# Patient Record
Sex: FEMALE | Race: WHITE | Employment: FULL TIME | URBAN - METROPOLITAN AREA
[De-identification: names, ages, dates, MRNs, and addresses within clinical notes are randomized per-mention and may not be internally consistent; named-entity substitution may affect disease eponyms.]

---

## 2017-05-02 ENCOUNTER — RADIANT APPOINTMENT (OUTPATIENT)
Dept: MAMMOGRAPHY | Facility: CLINIC | Age: 57
End: 2017-05-02
Attending: FAMILY MEDICINE
Payer: COMMERCIAL

## 2017-05-02 DIAGNOSIS — Z12.31 VISIT FOR SCREENING MAMMOGRAM: ICD-10-CM

## 2017-05-02 PROCEDURE — G0202 SCR MAMMO BI INCL CAD: HCPCS | Mod: TC

## 2017-10-24 ENCOUNTER — OFFICE VISIT (OUTPATIENT)
Dept: FAMILY MEDICINE | Facility: CLINIC | Age: 57
End: 2017-10-24
Payer: COMMERCIAL

## 2017-10-24 VITALS
OXYGEN SATURATION: 99 % | TEMPERATURE: 96.7 F | DIASTOLIC BLOOD PRESSURE: 70 MMHG | RESPIRATION RATE: 14 BRPM | BODY MASS INDEX: 17.47 KG/M2 | HEART RATE: 65 BPM | SYSTOLIC BLOOD PRESSURE: 102 MMHG | HEIGHT: 70 IN | WEIGHT: 122 LBS

## 2017-10-24 DIAGNOSIS — Z00.00 ROUTINE GENERAL MEDICAL EXAMINATION AT A HEALTH CARE FACILITY: Primary | ICD-10-CM

## 2017-10-24 DIAGNOSIS — Z13.220 LIPID SCREENING: ICD-10-CM

## 2017-10-24 DIAGNOSIS — Z23 NEED FOR VACCINATION: ICD-10-CM

## 2017-10-24 DIAGNOSIS — Z23 NEED FOR PROPHYLACTIC VACCINATION WITH TETANUS-DIPHTHERIA (TD): ICD-10-CM

## 2017-10-24 DIAGNOSIS — Z23 NEED FOR PROPHYLACTIC VACCINATION AND INOCULATION AGAINST INFLUENZA: ICD-10-CM

## 2017-10-24 PROCEDURE — 90472 IMMUNIZATION ADMIN EACH ADD: CPT | Performed by: FAMILY MEDICINE

## 2017-10-24 PROCEDURE — 99396 PREV VISIT EST AGE 40-64: CPT | Mod: 25 | Performed by: FAMILY MEDICINE

## 2017-10-24 PROCEDURE — 90471 IMMUNIZATION ADMIN: CPT | Performed by: FAMILY MEDICINE

## 2017-10-24 PROCEDURE — 80061 LIPID PANEL: CPT | Performed by: FAMILY MEDICINE

## 2017-10-24 PROCEDURE — 36415 COLL VENOUS BLD VENIPUNCTURE: CPT | Performed by: FAMILY MEDICINE

## 2017-10-24 PROCEDURE — 90686 IIV4 VACC NO PRSV 0.5 ML IM: CPT | Performed by: FAMILY MEDICINE

## 2017-10-24 PROCEDURE — 90715 TDAP VACCINE 7 YRS/> IM: CPT | Performed by: FAMILY MEDICINE

## 2017-10-24 NOTE — NURSING NOTE
"Chief Complaint   Patient presents with     Physical     /70  Pulse 65  Temp 96.7  F (35.9  C) (Tympanic)  Resp 14  Ht 5' 9.75\" (1.772 m)  Wt 122 lb (55.3 kg)  LMP 06/21/2011 (Exact Date)  SpO2 99%  Breastfeeding? No  BMI 17.63 kg/m2 Estimated body mass index is 17.63 kg/(m^2) as calculated from the following:    Height as of this encounter: 5' 9.75\" (1.772 m).    Weight as of this encounter: 122 lb (55.3 kg).  BP completed using cuff size: everardo Hahn CMA    Health Maintenance Due   Topic Date Due     ADVANCE DIRECTIVE PLANNING Q5 YRS  06/05/2017     LIPID SCREEN Q5 YR FEMALE (SYSTEM ASSIGNED)  08/24/2017     Health Maintenance reviewed at today's visit patient asked to schedule/complete:   Immunizations:  Patient agrees to schedule    "

## 2017-10-24 NOTE — PROGRESS NOTES
SUBJECTIVE:   CC: Sue Wakefield is an 57 year old woman who presents for preventive health visit.     Physical   Annual:     Getting at least 3 servings of Calcium per day::  Yes    Bi-annual eye exam::  Yes    Dental care twice a year::  Yes    Sleep apnea or symptoms of sleep apnea::  Daytime drowsiness    Diet::  Regular (no restrictions)    Frequency of exercise::  4-5 days/week    Duration of exercise::  15-30 minutes    Taking medications regularly::  Yes    Medication side effects::  Not applicable    Additional concerns today::  No    Answers for HPI/ROS submitted by the patient on 10/23/2017   PHQ-2 Score: 0          Today's PHQ-2 Score:   PHQ-2 ( 1999 [employer]) 10/24/2017   Q1: Little interest or pleasure in doing things 0   Q2: Feeling down, depressed or hopeless 0   PHQ-2 Score 0   Q1: Little interest or pleasure in doing things -   Q2: Feeling down, depressed or hopeless -   PHQ-2 Score -       Abuse: Current or Past(Physical, Sexual or Emotional)- No  Do you feel safe in your environment - Yes    Social History   Substance Use Topics     Smoking status: Former Smoker     Packs/day: 0.10     Years: 4.00     Types: Cigarettes     Quit date: 1/1/1964     Smokeless tobacco: Never Used      Comment: 'Social smoker' 25 years ago     Alcohol use 0.0 oz/week     0 Standard drinks or equivalent per week      Comment: occasionally, socially  -4/weekend.     The patient does not drink >3 drinks per day nor >7 drinks per week.    Reviewed orders with patient.  Reviewed health maintenance and updated orders accordingly - Yes  Labs reviewed in [facility]    Patient over age 50, mutual decision to screen reflected in health maintenance.      Pertinent mammograms are reviewed under the imaging tab.  History of abnormal Pap smear: NO - age 30- 65 PAP every 3 years recommended    Reviewed and updated as needed this visit by clinical staffTobacco  Allergies  Meds  Problems  Med Hx  Surg Hx  Fam Hx  Soc Hx       "    Reviewed and updated as needed this visit by Provider  Allergies  Meds  Problems            Review of Systems  C: NEGATIVE for fever, chills, change in weight  I: NEGATIVE for worrisome rashes, moles or lesions  E: NEGATIVE for vision changes or irritation  ENT: NEGATIVE for ear, mouth and throat problems  R: NEGATIVE for significant cough or SOB  B: NEGATIVE for masses, tenderness or discharge  CV: NEGATIVE for chest pain, palpitations or peripheral edema  GI: NEGATIVE for nausea, abdominal pain, heartburn, or change in bowel habits  : NEGATIVE for unusual urinary or vaginal symptoms. No vaginal bleeding.  M: NEGATIVE for significant arthralgias or myalgia  N: NEGATIVE for weakness, dizziness or paresthesias  P: NEGATIVE for changes in mood or affect      OBJECTIVE:   /70  Pulse 65  Temp 96.7  F (35.9  C) (Tympanic)  Resp 14  Ht 5' 9.75\" (1.772 m)  Wt 122 lb (55.3 kg)  LMP 06/21/2011 (Exact Date)  SpO2 99%  Breastfeeding? No  BMI 17.63 kg/m2  Physical Exam  GENERAL APPEARANCE: healthy, alert and no distress  EYES: Eyes grossly normal to inspection, PERRL and conjunctivae and sclerae normal  NECK: no adenopathy, no asymmetry, masses, or scars and thyroid normal to palpation  RESP: lungs clear to auscultation - no rales, rhonchi or wheezes  BREAST: normal without masses, tenderness or nipple discharge and no palpable axillary masses or adenopathy  CV: regular rate and rhythm, normal S1 S2, no S3 or S4, no murmur, click or rub, no peripheral edema and peripheral pulses strong  ABDOMEN: soft, nontender, no hepatosplenomegaly, no masses and bowel sounds normal  MS: no musculoskeletal defects are noted and gait is age appropriate without ataxia  SKIN: no suspicious lesions or rashes  NEURO: Normal strength and tone, sensory exam grossly normal, mentation intact and speech normal  PSYCH: mentation appears normal and affect normal/bright  LYMPH: Negative CCOA nodes and thyroid and inguinal nodes "       ASSESSMENT/PLAN:       ICD-10-CM    1. Routine general medical examination at a health care facility Z00.00    2. Need for prophylactic vaccination and inoculation against influenza Z23 Each additional admin.  (Right click and add QUANTITY)  [50427]     FLU VAC, SPLIT VIRUS IM > 3 YO (QUADRIVALENT) [69608]   3. Need for prophylactic vaccination with tetanus-diphtheria (TD) Z23 TDAP VACCINE (ADACEL) [74154.002]     1st  Administration  [84937]   4. Lipid screening Z13.220 Lipid panel reflex to direct LDL Fasting     CANCELED: Lipid panel reflex to direct LDL Fasting   5. Need for vaccination Z23        COUNSELING:  Reviewed preventive health counseling, as reflected in patient instructions       Regular exercise       Healthy diet/nutrition    Patient Instructions     Preventive Health Recommendations  Female Ages 50 - 64    Yearly exam: See your health care provider every year in order to  o Review health changes.   o Discuss preventive care.    o Review your medicines if your doctor has prescribed any.      Get a Pap test every three years (unless you have an abnormal result and your provider advises testing more often).    If you get Pap tests with HPV test, you only need to test every 5 years, unless you have an abnormal result.     You do not need a Pap test if your uterus was removed (hysterectomy) and you have not had cancer.    You should be tested each year for STDs (sexually transmitted diseases) if you're at risk.     Have a mammogram every 1 to 2 years.    Have a colonoscopy at age 50, or have a yearly FIT test (stool test). These exams screen for colon cancer.      Have a cholesterol test every 5 years, or more often if advised.    Have a diabetes test (fasting glucose) every three years. If you are at risk for diabetes, you should have this test more often.     If you are at risk for osteoporosis (brittle bone disease), think about having a bone density scan (DEXA).    Shots: Get a flu shot each  "year. Get a tetanus shot every 10 years.    Nutrition:     Eat at least 5 servings of fruits and vegetables each day.    Eat whole-grain bread, whole-wheat pasta and brown rice instead of white grains and rice.    Talk to your provider about Calcium and Vitamin D.     Lifestyle    Exercise at least 150 minutes a week (30 minutes a day, 5 days a week). This will help you control your weight and prevent disease.    Limit alcohol to one drink per day.    No smoking.     Wear sunscreen to prevent skin cancer.     See your dentist every six months for an exam and cleaning.    See your eye doctor every 1 to 2 years.    1. Please do your breast exam every mo, when you  Change the  calendar page or set an alarm on your cell phone Do a  visual check for dimples, inversion or indentation or any different position of the nipple Feel manually  for any 1cm or larger  size mass ie about the size of an almond Be sure to cover the entire area of both breasts : this extends back to the back on either side and from the collar bone to the bottom of the breasts where you can begin to feel ribs.    2. . Schedule your mammo at [facility]  At [address] at [phone]     due 5-18       reports that she quit smoking about 53 years ago. Her smoking use included Cigarettes. She has a 0.40 pack-year smoking history. She has never used smokeless tobacco.    Estimated body mass index is 17.63 kg/(m^2) as calculated from the following:    Height as of this encounter: 5' 9.75\" (1.772 m).    Weight as of this encounter: 122 lb (55.3 kg).         Counseling Resources:  ATP IV Guidelines  Pooled Cohorts Equation Calculator  Breast Cancer Risk Calculator  FRAX Risk Assessment  ICSI Preventive Guidelines  Dietary Guidelines for Americans, 2010  USDA's MyPlate  ASA Prophylaxis  Lung CA Screening    Johanna Rodriguez MD  [facility] XERXESInjectable Influenza Immunization Documentation    1.  Is the person to be " vaccinated sick today?   No    2. Does the person to be vaccinated have an allergy to a component   of the vaccine?   No  Egg Allergy Algorithm Link    3. Has the person to be vaccinated ever had a serious reaction   to influenza vaccine in the past?   No    4. Has the person to be vaccinated ever had Guillain-Barré syndrome?   No    Form completed by Agnes Hahn CMA

## 2017-10-24 NOTE — MR AVS SNAPSHOT
After Visit Summary   10/24/2017    Sue Wakefield    MRN: [id number]           Patient Information     Date Of Birth          1960        Visit Information        Provider Department      10/24/2017 4:00 PM Johanna Rodriguez MD [facility]        Today's Diagnoses     Routine general medical examination at a health care facility    -  1    Need for prophylactic vaccination and inoculation against influenza        Need for prophylactic vaccination with tetanus-diphtheria (TD)        Screening for diabetes mellitus        Lipid screening        Need for vaccination          Care Instructions      Preventive Health Recommendations  Female Ages 50 - 64    Yearly exam: See your health care provider every year in order to  o Review health changes.   o Discuss preventive care.    o Review your medicines if your doctor has prescribed any.      Get a Pap test every three years (unless you have an abnormal result and your provider advises testing more often).    If you get Pap tests with HPV test, you only need to test every 5 years, unless you have an abnormal result.     You do not need a Pap test if your uterus was removed (hysterectomy) and you have not had cancer.    You should be tested each year for STDs (sexually transmitted diseases) if you're at risk.     Have a mammogram every 1 to 2 years.    Have a colonoscopy at age 50, or have a yearly FIT test (stool test). These exams screen for colon cancer.      Have a cholesterol test every 5 years, or more often if advised.    Have a diabetes test (fasting glucose) every three years. If you are at risk for diabetes, you should have this test more often.     If you are at risk for osteoporosis (brittle bone disease), think about having a bone density scan (DEXA).    Shots: Get a flu shot each year. Get a tetanus shot every 10 years.    Nutrition:     Eat at least 5 servings of fruits and vegetables each day.    Eat  whole-grain bread, whole-wheat pasta and brown rice instead of white grains and rice.    Talk to your provider about Calcium and Vitamin D.     Lifestyle    Exercise at least 150 minutes a week (30 minutes a day, 5 days a week). This will help you control your weight and prevent disease.    Limit alcohol to one drink per day.    No smoking.     Wear sunscreen to prevent skin cancer.     See your dentist every six months for an exam and cleaning.    See your eye doctor every 1 to 2 years.    1. Please do your breast exam every mo, when you  Change the  calendar page or set an alarm on your cell phone Do a  visual check for dimples, inversion or indentation or any different position of the nipple Feel manually  for any 1cm or larger  size mass ie about the size of an almond Be sure to cover the entire area of both breasts : this extends back to the back on either side and from the collar bone to the bottom of the breasts where you can begin to feel ribs.    2. . Schedule your mammo at [facility]  At [address] at [phone]              Follow-ups after your visit        Who to contact     If you have questions or need follow up information about today's clinic visit or your schedule please contact [facility] directly at [phone].  Normal or non-critical lab and imaging results will be communicated to you by [employer], letter or phone within 4 business days after the clinic has received the results. If you do not hear from us within 7 days, please contact the clinic through [employer] or phone. If you have a critical or abnormal lab result, we will notify you by phone as soon as possible.  Submit refill requests through [employer] or call your pharmacy and they will forward the refill request to us. Please allow 3 business days for your refill to be completed.          Additional Information About Your Visit        [employer] Information     [employer] gives you secure access to  "your electronic health record. If you see a primary care provider, you can also send messages to your care team and make appointments. If you have questions, please call your primary care clinic.  If you do not have a primary care provider, please call [phone] and they will assist you.        Care EveryWhere ID     This is your Care EveryWhere ID. This could be used by other organizations to access your [facility] medical records  [id number]        Your Vitals Were     Pulse Temperature Respirations Height Last Period Pulse Oximetry    65 96.7  F (35.9  C) (Tympanic) 14 5' 9.75\" (1.772 m) 06/21/2011 (Exact Date) 99%    Breastfeeding? BMI (Body Mass Index)                No 17.63 kg/m2           Blood Pressure from Last 3 Encounters:   10/24/17 102/70   10/11/16 98/60   10/08/15 112/68    Weight from Last 3 Encounters:   10/24/17 122 lb (55.3 kg)   10/11/16 120 lb (54.4 kg)   10/08/15 120 lb 8 oz (54.7 kg)              We Performed the Following     1st  Administration  [04666]     Basic metabolic panel     Each additional admin.  (Right click and add QUANTITY)  [98164]     FLU VAC, SPLIT VIRUS IM > 3 YO (QUADRIVALENT) [53187]     Lipid panel reflex to direct LDL Fasting     TDAP VACCINE (ADACEL) [48126.002]        Primary Care Provider Office Phone # Fax #    Johanna Karely Rodriguez [phone] [address] [facility] [address]        Equal Access to Services     KIERAN SANDOVAL : Hadii dariana ku hadasho Solashon, waaxda luqadaha, qaybta kaalmada evan smith. So [facility] [place] [phone].    ATENCIÓN: Si habla español, tiene a zaman disposición servicios gratuitos de asistencia lingüística. Llame al [phone].    We comply with applicable federal civil rights laws and [place] laws. We do not discriminate on the basis of race, color, national origin, age, disability, sex, sexual orientation, or gender identity.            Thank you!     Thank you for " choosing [facility]  for your care. Our goal is always to provide you with excellent care. Hearing back from our patients is one way we can continue to improve our services. Please take a few minutes to complete the written survey that you may receive in the mail after your visit with us. Thank you!             Your Updated Medication List - Protect others around you: Learn how to safely use, store and throw away your medicines at www.disposemymeds.org.          This list is accurate as of: 10/24/17  4:22 PM.  Always use your most recent med list.                   Brand Name Dispense Instructions for use Diagnosis    aspirin 81 MG EC tablet     90 tablet    Take 1 tablet (81 mg) by mouth daily    Routine general medical examination at a health care facility       CALCIUM 600 + D 600-200 MG-UNIT Tabs      1 tab BID        magnesium 250 MG tablet      Take 1 tablet by mouth daily.        PROGESTERONE EX

## 2017-10-24 NOTE — PATIENT INSTRUCTIONS
Preventive Health Recommendations  Female Ages 50 - 64    Yearly exam: See your health care provider every year in order to  o Review health changes.   o Discuss preventive care.    o Review your medicines if your doctor has prescribed any.      Get a Pap test every three years (unless you have an abnormal result and your provider advises testing more often).    If you get Pap tests with HPV test, you only need to test every 5 years, unless you have an abnormal result.     You do not need a Pap test if your uterus was removed (hysterectomy) and you have not had cancer.    You should be tested each year for STDs (sexually transmitted diseases) if you're at risk.     Have a mammogram every 1 to 2 years.    Have a colonoscopy at age 50, or have a yearly FIT test (stool test). These exams screen for colon cancer.      Have a cholesterol test every 5 years, or more often if advised.    Have a diabetes test (fasting glucose) every three years. If you are at risk for diabetes, you should have this test more often.     If you are at risk for osteoporosis (brittle bone disease), think about having a bone density scan (DEXA).    Shots: Get a flu shot each year. Get a tetanus shot every 10 years.    Nutrition:     Eat at least 5 servings of fruits and vegetables each day.    Eat whole-grain bread, whole-wheat pasta and brown rice instead of white grains and rice.    Talk to your provider about Calcium and Vitamin D.     Lifestyle    Exercise at least 150 minutes a week (30 minutes a day, 5 days a week). This will help you control your weight and prevent disease.    Limit alcohol to one drink per day.    No smoking.     Wear sunscreen to prevent skin cancer.     See your dentist every six months for an exam and cleaning.    See your eye doctor every 1 to 2 years.    1. Please do your breast exam every mo, when you  Change the  calendar page or set an alarm on your cell phone Do a  visual check for dimples, inversion or  indentation or any different position of the nipple Feel manually  for any 1cm or larger  size mass ie about the size of an almond Be sure to cover the entire area of both breasts : this extends back to the back on either side and from the collar bone to the bottom of the breasts where you can begin to feel ribs.    2. . Schedule your mammo at [facility]  At [address] at [phone]     due 5-18

## 2017-10-24 NOTE — LETTER
"  11/2/2017     Sue Wakefield  [address] [place] [id number]    Dear Sue:    Here are the results of your latest lipid tests:    LDL Cholesterol Calculated   Date Value Ref Range Status   10/24/2017 13 <100 mg/dL Final     Comment:     Desirable:       <100 mg/dl   ]  HDL Cholesterol   Date Value Ref Range Status   10/24/2017 98 >49 mg/dL Final   ]  Triglycerides   Date Value Ref Range Status   10/24/2017 64 <150 mg/dL Final   ]  Cholesterol   Date Value Ref Range Status   10/24/2017 124 <200 mg/dL Final   ]    LDL is the \"bad\" cholesterol linked to heart disease and stroke.   HDL is the \"good\" choesterol and when it is high, it decreases the risk for above problems.    Looks good !!!   Follow a low-fat, low-cholesterol diet and get regular exercise.  Please feel free to call the clinic if you have any questions.    Sincerely,      Johanna Rodriguez   "

## 2017-10-25 LAB
CHOLEST SERPL-MCNC: 124 MG/DL
HDLC SERPL-MCNC: 98 MG/DL
LDLC SERPL CALC-MCNC: 13 MG/DL
NONHDLC SERPL-MCNC: 26 MG/DL
TRIGL SERPL-MCNC: 64 MG/DL

## 2018-05-08 ENCOUNTER — RADIANT APPOINTMENT (OUTPATIENT)
Dept: MAMMOGRAPHY | Facility: CLINIC | Age: 58
End: 2018-05-08
Attending: FAMILY MEDICINE
Payer: COMMERCIAL

## 2018-05-08 DIAGNOSIS — Z12.31 VISIT FOR SCREENING MAMMOGRAM: ICD-10-CM

## 2018-05-08 PROCEDURE — 77067 SCR MAMMO BI INCL CAD: CPT | Mod: TC

## 2018-10-25 ENCOUNTER — OFFICE VISIT (OUTPATIENT)
Dept: FAMILY MEDICINE | Facility: CLINIC | Age: 58
End: 2018-10-25
Payer: COMMERCIAL

## 2018-10-25 VITALS
WEIGHT: 123 LBS | OXYGEN SATURATION: 97 % | BODY MASS INDEX: 18.22 KG/M2 | DIASTOLIC BLOOD PRESSURE: 60 MMHG | TEMPERATURE: 98.7 F | RESPIRATION RATE: 20 BRPM | HEART RATE: 66 BPM | HEIGHT: 69 IN | SYSTOLIC BLOOD PRESSURE: 100 MMHG

## 2018-10-25 DIAGNOSIS — Z12.11 SPECIAL SCREENING FOR MALIGNANT NEOPLASMS, COLON: ICD-10-CM

## 2018-10-25 DIAGNOSIS — Z00.00 ROUTINE GENERAL MEDICAL EXAMINATION AT A HEALTH CARE FACILITY: Primary | ICD-10-CM

## 2018-10-25 DIAGNOSIS — Z23 NEED FOR PROPHYLACTIC VACCINATION AND INOCULATION AGAINST INFLUENZA: ICD-10-CM

## 2018-10-25 DIAGNOSIS — E44.1 MILD PROTEIN-CALORIE MALNUTRITION (H): ICD-10-CM

## 2018-10-25 PROCEDURE — 90471 IMMUNIZATION ADMIN: CPT | Performed by: FAMILY MEDICINE

## 2018-10-25 PROCEDURE — 99396 PREV VISIT EST AGE 40-64: CPT | Mod: 25 | Performed by: FAMILY MEDICINE

## 2018-10-25 PROCEDURE — 90686 IIV4 VACC NO PRSV 0.5 ML IM: CPT | Performed by: FAMILY MEDICINE

## 2018-10-25 NOTE — NURSING NOTE
"Chief Complaint   Patient presents with     Physical     /60  Pulse 66  Temp 98.7  F (37.1  C) (Tympanic)  Resp 20  Ht 5' 8.5\" (1.74 m)  Wt 123 lb (55.8 kg)  LMP 06/21/2011 (Exact Date)  SpO2 97%  Breastfeeding? No  BMI 18.43 kg/m2 Estimated body mass index is 18.43 kg/(m^2) as calculated from the following:    Height as of this encounter: 5' 8.5\" (1.74 m).    Weight as of this encounter: 123 lb (55.8 kg).  BP completed using cuff size: everardo Hahn CMA    Health Maintenance Due   Topic Date Due     HIV SCREEN (SYSTEM ASSIGNED)  06/05/1980     ADVANCE DIRECTIVE PLANNING Q5 YRS  06/05/2017     INFLUENZA VACCINE (1) 09/01/2018     PHQ-2 Q1 YR  10/24/2018     Health Maintenance reviewed at today's visit patient asked to schedule/complete:   Hypertension Management:  Completed at today's visit.  Immunizations:  Patient agrees to schedule    "

## 2018-10-25 NOTE — PROGRESS NOTES
SUBJECTIVE:   CC: Sue Wakefield is an 58 year old woman who presents for preventive health visit.     Physical   Annual:     Getting at least 3 servings of Calcium per day:  NO    Bi-annual eye exam:  Yes    Dental care twice a year:  Yes    Sleep apnea or symptoms of sleep apnea:  Daytime drowsiness    Diet:  Regular (no restrictions)    Frequency of exercise:  4-5 days/week    Duration of exercise:  15-30 minutes    Taking medications regularly:  Yes    Medication side effects:  Not applicable    Additional concerns today:  No            Today's PHQ-2 Score:   PHQ-2 ( 1999 Pf[employer]) 10/25/2018   Q1: Little interest or pleasure in doing things 0   Q2: Feeling down, depressed or hopeless 0   PHQ-2 Score 0   Q1: Little interest or pleasure in doing things Not at all   Q2: Feeling down, depressed or hopeless Not at all   PHQ-2 Score 0       Abuse: Current or Past(Physical, Sexual or Emotional)- No  Do you feel safe in your environment - Yes    Social History   Substance Use Topics     Smoking status: Former Smoker     Packs/day: 0.10     Years: 4.00     Types: Cigarettes     Quit date: 1/1/1964     Smokeless tobacco: Never Used      Comment: 'Social smoker' 25 years ago     Alcohol use 0.0 oz/week     0 Standard drinks or equivalent per week      Comment: occasionally, socially  -4/weekend.     Alcohol Use 10/25/2018   If you drink alcohol do you typically have greater than 3 drinks per day OR greater than 7 drinks per week? No   No flowsheet data found.    Reviewed orders with patient.  Reviewed health maintenance and updated orders accordingly - Yes  Labs reviewed in [facility]    Patient over age 50, mutual decision to screen reflected in health maintenance.    Pertinent mammograms are reviewed under the imaging tab.  History of abnormal Pap smear: NO - age 30- 65 PAP every 3 years recommended  PAP / HPV Latest Ref Rng & Units 10/8/2015 8/14/2012 8/9/2011   PAP - NIL NIL NIL   HPV 16 DNA NEG Negative - -   HPV 18 DNA  "NEG Negative - -   OTHER HR HPV NEG Negative - -     Reviewed and updated as needed this visit by clinical staff  Tobacco  Allergies  Meds  Problems  Med Hx  Surg Hx  Fam Hx  Soc Hx          Reviewed and updated as needed this visit by Provider  Allergies  Meds  Problems            Review of Systems  CONSTITUTIONAL: NEGATIVE for fever, chills, change in weight  INTEGUMENTARY/SKIN: NEGATIVE for worrisome rashes, moles or lesions  EYES: NEGATIVE for vision changes or irritation  ENT: NEGATIVE for ear, mouth and throat problems  RESP: NEGATIVE for significant cough or SOB  BREAST: NEGATIVE for masses, tenderness or discharge  CV: NEGATIVE for chest pain, palpitations or peripheral edema  GI: NEGATIVE for nausea, abdominal pain, heartburn, or change in bowel habits  : NEGATIVE for unusual urinary or vaginal symptoms. No vaginal bleeding.  MUSCULOSKELETAL: NEGATIVE for significant arthralgias or myalgia  NEURO: NEGATIVE for weakness, dizziness or paresthesias  PSYCHIATRIC: NEGATIVE for changes in mood or affect      OBJECTIVE:   /60  Pulse 66  Temp 98.7  F (37.1  C) (Tympanic)  Resp 20  Ht 5' 8.5\" (1.74 m)  Wt 123 lb (55.8 kg)  LMP 06/21/2011 (Exact Date)  SpO2 97%  Breastfeeding? No  BMI 18.43 kg/m2  Physical Exam  GENERAL APPEARANCE: healthy, alert and no distress  EYES: Eyes grossly normal to inspection, PERRL and conjunctivae and sclerae normal  NECK: no adenopathy, no asymmetry, masses, or scars and thyroid normal to palpation  RESP: lungs clear to auscultation - no rales, rhonchi or wheezes  BREAST: normal without masses, tenderness or nipple discharge and no palpable axillary masses or adenopathy  CV: regular rate and rhythm, normal S1 S2, no S3 or S4, no murmur, click or rub, no peripheral edema and peripheral pulses strong  ABDOMEN: soft, nontender, no hepatosplenomegaly, no masses and bowel sounds normal  MS: no musculoskeletal defects are noted and gait is age appropriate without " "ataxia  SKIN: no suspicious lesions or rashes  NEURO: Normal strength and tone, sensory exam grossly normal, mentation intact and speech normal  PSYCH: mentation appears normal and affect normal/bright    Diagnostic Test Results:  0    ASSESSMENT/PLAN:       ICD-10-CM    1. Routine general medical examination at a health care facility Z00.00    2. Mild protein-calorie malnutrition (H) E44.1    3. Need for prophylactic vaccination and inoculation against influenza Z23 FLU VACCINE, SPLIT VIRUS, IM (QUADRIVALENT) [75240]- >3 YRS     Vaccine Administration, Initial [96179]   4. Special screening for malignant neoplasms, colon Z12.11 Fecal colorectal cancer screen FIT       COUNSELING:  Reviewed preventive health counseling, as reflected in patient instructions       Regular exercise       Healthy diet/nutrition    BP Readings from Last 1 Encounters:   10/25/18 100/60     Estimated body mass index is 18.43 kg/(m^2) as calculated from the following:    Height as of this encounter: 5' 8.5\" (1.74 m).    Weight as of this encounter: 123 lb (55.8 kg).    Patient Instructions     Preventive Health Recommendations  Female Ages 50 - 64    Yearly exam: See your health care provider every year in order to  o Review health changes.   o Discuss preventive care.    o Review your medicines if your doctor has prescribed any.      Get a Pap test every three years (unless you have an abnormal result and your provider advises testing more often).    If you get Pap tests with HPV test, you only need to test every 5 years, unless you have an abnormal result.     You do not need a Pap test if your uterus was removed (hysterectomy) and you have not had cancer.    You should be tested each year for STDs (sexually transmitted diseases) if you're at risk.     Have a mammogram every 1 to 2 years.    Have a colonoscopy at age 50, or have a yearly FIT test (stool test). These exams screen for colon cancer.      Have a cholesterol test every 5 " years, or more often if advised.    Have a diabetes test (fasting glucose) every three years. If you are at risk for diabetes, you should have this test more often.     If you are at risk for osteoporosis (brittle bone disease), think about having a bone density scan (DEXA).    Shots: Get a flu shot each year. Get a tetanus shot every 10 years.    Nutrition:     Eat at least 5 servings of fruits and vegetables each day.    Eat whole-grain bread, whole-wheat pasta and brown rice instead of white grains and rice.    Get adequate Calcium and Vitamin D.     Lifestyle    Exercise at least 150 minutes a week (30 minutes a day, 5 days a week). This will help you control your weight and prevent disease.    Limit alcohol to one drink per day.    No smoking.     Wear sunscreen to prevent skin cancer.     See your dentist every six months for an exam and cleaning.    See your eye doctor every 1 to 2 years.    1. Shingrex is a 2 shot series that prevents shingles 97% of the time, as opposed to the old shingles shot that only prevented it at 40-50%  It costs less for medicare at a pharmacy  You should get it starting at 50 yrs old get the 2nd shot 5-6 mo after the first one    2. Pap is due in 2020    3.  Eat calcium : dairy and greens  Do not take calcium in pill form as it can plaque on the heart arteries and cause kidney stones    4. Your vitamin D is normal.  Please take 1,000 units in the summer and 2,000 units in the winter to maintain it       5.  Colonoscopy due in 2023    6. Please do your breast exam every mo, when you  Change the  calendar page or set an alarm on your cell phone Do a  visual check for dimples, inversion or indentation or any different position of the nipple Feel manually  for any 1cm or larger  size mass ie about the size of an almond Be sure to cover the entire area of both breasts : this extends back to the back on either side and from the collar bone to the bottom of the breasts where you can begin  to feel ribs.  Men are advised to do this exam also as they now have a higher rate of breast cancer , like women do .           Weight management plan noted, stable and monitoring     reports that she quit smoking about 54 years ago. Her smoking use included Cigarettes. She has a 0.40 pack-year smoking history. She has never used smokeless tobacco.      Counseling Resources:  ATP IV Guidelines  Pooled Cohorts Equation Calculator  Breast Cancer Risk Calculator  FRAX Risk Assessment  ICSI Preventive Guidelines  Dietary Guidelines for Americans, 2010  USDA's MyPlate  ASA Prophylaxis  Lung CA Screening    Johanna Rodriguez MD  [facility]  Answers for HPI/ROS submitted by the patient on 10/25/2018   PHQ-2 Score: 0

## 2018-10-25 NOTE — PATIENT INSTRUCTIONS
Preventive Health Recommendations  Female Ages 50 - 64    Yearly exam: See your health care provider every year in order to  o Review health changes.   o Discuss preventive care.    o Review your medicines if your doctor has prescribed any.      Get a Pap test every three years (unless you have an abnormal result and your provider advises testing more often).    If you get Pap tests with HPV test, you only need to test every 5 years, unless you have an abnormal result.     You do not need a Pap test if your uterus was removed (hysterectomy) and you have not had cancer.    You should be tested each year for STDs (sexually transmitted diseases) if you're at risk.     Have a mammogram every 1 to 2 years.    Have a colonoscopy at age 50, or have a yearly FIT test (stool test). These exams screen for colon cancer.      Have a cholesterol test every 5 years, or more often if advised.    Have a diabetes test (fasting glucose) every three years. If you are at risk for diabetes, you should have this test more often.     If you are at risk for osteoporosis (brittle bone disease), think about having a bone density scan (DEXA).    Shots: Get a flu shot each year. Get a tetanus shot every 10 years.    Nutrition:     Eat at least 5 servings of fruits and vegetables each day.    Eat whole-grain bread, whole-wheat pasta and brown rice instead of white grains and rice.    Get adequate Calcium and Vitamin D.     Lifestyle    Exercise at least 150 minutes a week (30 minutes a day, 5 days a week). This will help you control your weight and prevent disease.    Limit alcohol to one drink per day.    No smoking.     Wear sunscreen to prevent skin cancer.     See your dentist every six months for an exam and cleaning.    See your eye doctor every 1 to 2 years.    1. Shingrex is a 2 shot series that prevents shingles 97% of the time, as opposed to the old shingles shot that only prevented it at 40-50%  It costs less for medicare at a  pharmacy  You should get it starting at 50 yrs old get the 2nd shot 5-6 mo after the first one    2. Pap is due in 2020    3.  Eat calcium : dairy and greens  Do not take calcium in pill form as it can plaque on the heart arteries and cause kidney stones    4. Your vitamin D is normal.  Please take 1,000 units in the summer and 2,000 units in the winter to maintain it       5.  Colonoscopy due in 2023    6. Please do your breast exam every mo, when you  Change the  calendar page or set an alarm on your cell phone Do a  visual check for dimples, inversion or indentation or any different position of the nipple Feel manually  for any 1cm or larger  size mass ie about the size of an almond Be sure to cover the entire area of both breasts : this extends back to the back on either side and from the collar bone to the bottom of the breasts where you can begin to feel ribs.  Men are advised to do this exam also as they now have a higher rate of breast cancer , like women do .

## 2018-10-25 NOTE — PROGRESS NOTES

## 2018-10-28 PROCEDURE — 82274 ASSAY TEST FOR BLOOD FECAL: CPT | Performed by: FAMILY MEDICINE

## 2018-10-31 DIAGNOSIS — Z12.11 SPECIAL SCREENING FOR MALIGNANT NEOPLASMS, COLON: ICD-10-CM

## 2018-10-31 LAB — HEMOCCULT STL QL IA: NEGATIVE

## 2019-05-13 ENCOUNTER — ANCILLARY PROCEDURE (OUTPATIENT)
Dept: MAMMOGRAPHY | Facility: CLINIC | Age: 59
End: 2019-05-13
Payer: COMMERCIAL

## 2019-05-13 DIAGNOSIS — Z12.31 VISIT FOR SCREENING MAMMOGRAM: ICD-10-CM

## 2019-05-13 PROCEDURE — 77067 SCR MAMMO BI INCL CAD: CPT | Mod: TC

## 2019-05-13 PROCEDURE — 77063 BREAST TOMOSYNTHESIS BI: CPT | Mod: TC

## 2019-09-29 ENCOUNTER — HEALTH MAINTENANCE LETTER (OUTPATIENT)
Age: 59
End: 2019-09-29

## 2019-10-30 ENCOUNTER — OFFICE VISIT (OUTPATIENT)
Dept: INTERNAL MEDICINE | Facility: CLINIC | Age: 59
End: 2019-10-30
Payer: COMMERCIAL

## 2019-10-30 VITALS
HEIGHT: 68 IN | SYSTOLIC BLOOD PRESSURE: 86 MMHG | OXYGEN SATURATION: 97 % | DIASTOLIC BLOOD PRESSURE: 58 MMHG | BODY MASS INDEX: 18.96 KG/M2 | RESPIRATION RATE: 16 BRPM | HEART RATE: 53 BPM | WEIGHT: 125.1 LBS

## 2019-10-30 DIAGNOSIS — Z00.00 ROUTINE HISTORY AND PHYSICAL EXAMINATION OF ADULT: Primary | ICD-10-CM

## 2019-10-30 PROBLEM — E44.1 MILD PROTEIN-CALORIE MALNUTRITION (H): Status: RESOLVED | Noted: 2018-10-25 | Resolved: 2019-10-30

## 2019-10-30 PROCEDURE — 99386 PREV VISIT NEW AGE 40-64: CPT | Performed by: INTERNAL MEDICINE

## 2019-10-30 SDOH — HEALTH STABILITY: MENTAL HEALTH: HOW MANY STANDARD DRINKS CONTAINING ALCOHOL DO YOU HAVE ON A TYPICAL DAY?: 1 OR 2

## 2019-10-30 SDOH — HEALTH STABILITY: MENTAL HEALTH: HOW OFTEN DO YOU HAVE A DRINK CONTAINING ALCOHOL?: 2-4 TIMES A MONTH

## 2019-10-30 SDOH — HEALTH STABILITY: MENTAL HEALTH: HOW OFTEN DO YOU HAVE 6 OR MORE DRINKS ON ONE OCCASION?: NEVER

## 2019-10-30 ASSESSMENT — MIFFLIN-ST. JEOR: SCORE: 1193.01

## 2019-10-30 NOTE — PROGRESS NOTES
SUBJECTIVE                                                      HPI: Sue Wakefield is a pleasant 59 year old female who presents for a physical.    No specific complaints, concerns, or questions.    ROS:  Constitutional: denies unintentional weight loss or gain; denies fevers, chills, or sweats     Cardiovascular: denies chest pain, palpitations, or edema  Respiratory: denies cough, wheezing, shortness of breath, or dyspnea on exertion  Gastrointestinal: denies nausea, vomiting, constipation, diarrhea, or abdominal pain  Genitourinary: denies urinary frequency, urgency, dysuria, or hematuria  Integumentary: denies rash or pruritus  Musculoskeletal: denies back pain, muscle pain, joint pain, or joint swelling  Neurologic: denies focal weakness, numbness, or tingling  Hematologic/Immunologic: denies history of anemia or blood transfusions  Endocrine: denies heat or cold intolerance; denies polyuria, polydipsia  Psychiatric: denies anxiety; see preventative health below    Past Medical History:   Diagnosis Date     NO ACTIVE PROBLEMS      Past Surgical History:   Procedure Laterality Date     COLONOSCOPY  2013    Procedure: COLONOSCOPY;  colonoscopy;  Surgeon: Garry Reid MD;  Location:  GI     MICRODISCECTOMY      right L5-S1      TUBAL LIGATION       Family History   Problem Relation Age of Onset     Myocardial Infarction Father 51     Pacemaker Sister      Coronary Artery Disease Sister      Diabetes Type 2  Sister      Hypertension Brother      Breast Cancer No family hx of      Colon Cancer No family hx of      Ovarian Cancer No family hx of      Coronary Artery Disease Early Onset No family hx of      Social History     Occupational History     Occupation:      Employer: [employer]   Tobacco Use     Smoking status: Former Smoker     Packs/day: 0.00     Years: 25.00     Types: Cigarettes     Last attempt to quit: 1964     Years since quittin.8     Smokeless  "tobacco: Never Used     Tobacco comment: social smoker only   Substance and Sexual Activity     Alcohol use: Yes     Frequency: 2-4 times a month     Drinks per session: 1 or 2     Binge frequency: Never     Drug use: No     Sexual activity: Yes     Partners: Male   Social History Narrative    .    Two adult kids.    Walks regularly.      Allergies   Allergen Reactions     Erythromycin Hives     Current Outpatient Medications   Medication Sig     CALCIUM 600 + D 600-200 MG-IU OR TABS 1 tab BID     magnesium 250 MG tablet Take 1 tablet by mouth daily.     Misc Natural Products (PROGESTERONE EX)      Immunization History   Administered Date(s) Administered     DT (PEDS <7y) 08/12/1986     Influenza (IIV3) PF 10/06/2014, 10/02/2019     Influenza Vaccine IM > 6 months Valent IIV4 09/18/2013, 10/06/2014, 10/08/2015, 10/05/2016, 10/24/2017, 10/25/2018     TD (ADULT, 7+) 02/06/2007     TDAP Vaccine (Adacel) 10/24/2017     OBJECTIVE                                                      BP (!) 86/58   Pulse 53   Resp 16   Ht 1.715 m (5' 7.5\")   Wt 56.7 kg (125 lb 1.6 oz)   LMP 06/21/2011 (Exact Date)   SpO2 97%   BMI 19.30 kg/m    Constitutional: well-appearing  Eyes: normal conjunctivae and lids; pupils equal, round, and reactive to light  Ears, Nose, Mouth, and Throat: normal ears and nose; tympanic membranes visualized and normal; normal lips, teeth, and gums; no oropharyngeal lesions or ulcers  Neck: supple and symmetric; no lymphadenopathy; no thyromegaly or masses  Respiratory: normal respiratory effort; clear to auscultation bilaterally  Cardiovascular: regular rate and rhythm; pedal pulses palpable; no edema  Gastrointestinal: soft, non-tender, non-distended, and bowel sounds present; no organomegaly or masses  Musculoskeletal: normal gait and station  Psych: normal judgment and insight; normal mood and affect; recent and remote memory intact; oriented to time, place, and person    PREVENTATIVE HEALTH    "                                                   BMI: within normal limits   Blood pressure: low but asymptomatic  Breast CA screening: up to date   Cervical CA screening: up to date   Colon CA screening: up to date   Lung CA screening: n/a   Dexa: not medically indicated at this time   Screening HCV: completed  Screening HIV: DUE - perform with future labs  Screening cholesterol: up to date (reviewed outside labs - see Media)  Screening diabetes: up to date (reviewed outside labs - see Media)  STD testing: no risk factors present  Depression screening: PHQ-2 assessment completed and reviewed - no intervention indicated at this time  Alcohol misuse screening: alcohol use reviewed - no intervention indicated at this time  Immunizations: reviewed; Shingrix series DUE - will obtain in the pharmacy    ASSESSMENT/PLAN                                                       (Z00.00) Routine history and physical examination of adult  (primary encounter diagnosis)  Comment: PMH, PSH, FH, SH, medications, allergies, immunizations, and preventative health measures reviewed.   Plan: no preventive health interventions indicated at this time.    The instructions on the AVS were discussed and explained to the patient. Patient expressed understanding of instructions.    (Chart documentation was completed, in part, with [employer] voice-recognition software. Even though reviewed, some grammatical, spelling, and word errors may remain.)    Sonia Arndt MD   [address] [address]  T: [phone], F: [phone]

## 2020-05-22 ENCOUNTER — ANCILLARY PROCEDURE (OUTPATIENT)
Dept: MAMMOGRAPHY | Facility: CLINIC | Age: 60
End: 2020-05-22
Payer: COMMERCIAL

## 2020-05-22 DIAGNOSIS — Z12.31 VISIT FOR SCREENING MAMMOGRAM: ICD-10-CM

## 2020-05-22 PROCEDURE — 77063 BREAST TOMOSYNTHESIS BI: CPT | Mod: TC

## 2020-05-22 PROCEDURE — 77067 SCR MAMMO BI INCL CAD: CPT | Mod: TC

## 2020-09-26 ENCOUNTER — DOCUMENTATION ONLY (OUTPATIENT)
Dept: INTERNAL MEDICINE | Facility: CLINIC | Age: 60
End: 2020-09-26

## 2020-09-26 DIAGNOSIS — E55.9 VITAMIN D DEFICIENCY: ICD-10-CM

## 2020-09-26 DIAGNOSIS — Z13.220 SCREENING FOR CHOLESTEROL LEVEL: Primary | ICD-10-CM

## 2020-09-26 DIAGNOSIS — Z13.1 SCREENING FOR DIABETES MELLITUS: ICD-10-CM

## 2020-09-26 NOTE — PROGRESS NOTES
Please place or confirm orders for upcoming lab appointment on 10/02/2020. THANK YOU.   [see HPI] : see HPI [Negative] : Endocrine

## 2020-10-30 DIAGNOSIS — E55.9 VITAMIN D DEFICIENCY: ICD-10-CM

## 2020-10-30 DIAGNOSIS — Z13.1 SCREENING FOR DIABETES MELLITUS: ICD-10-CM

## 2020-10-30 DIAGNOSIS — Z13.220 SCREENING FOR CHOLESTEROL LEVEL: ICD-10-CM

## 2020-10-30 LAB
ALBUMIN SERPL-MCNC: 3.8 G/DL (ref 3.4–5)
ALP SERPL-CCNC: 53 U/L (ref 40–150)
ALT SERPL W P-5'-P-CCNC: 22 U/L (ref 0–50)
ANION GAP SERPL CALCULATED.3IONS-SCNC: 3 MMOL/L (ref 3–14)
AST SERPL W P-5'-P-CCNC: 20 U/L (ref 0–45)
BILIRUB SERPL-MCNC: 0.6 MG/DL (ref 0.2–1.3)
BUN SERPL-MCNC: 14 MG/DL (ref 7–30)
CALCIUM SERPL-MCNC: 9.4 MG/DL (ref 8.5–10.1)
CHLORIDE SERPL-SCNC: 108 MMOL/L (ref 94–109)
CHOLEST SERPL-MCNC: 170 MG/DL
CO2 SERPL-SCNC: 30 MMOL/L (ref 20–32)
CREAT SERPL-MCNC: 0.76 MG/DL (ref 0.52–1.04)
GFR SERPL CREATININE-BSD FRML MDRD: 86 ML/MIN/{1.73_M2}
GLUCOSE SERPL-MCNC: 86 MG/DL (ref 70–99)
HDLC SERPL-MCNC: 85 MG/DL
LDLC SERPL CALC-MCNC: 72 MG/DL
NONHDLC SERPL-MCNC: 85 MG/DL
POTASSIUM SERPL-SCNC: 3.8 MMOL/L (ref 3.4–5.3)
PROT SERPL-MCNC: 7.4 G/DL (ref 6.8–8.8)
SODIUM SERPL-SCNC: 141 MMOL/L (ref 133–144)
TRIGL SERPL-MCNC: 64 MG/DL

## 2020-10-30 PROCEDURE — 82306 VITAMIN D 25 HYDROXY: CPT | Performed by: INTERNAL MEDICINE

## 2020-10-30 PROCEDURE — 80061 LIPID PANEL: CPT | Performed by: INTERNAL MEDICINE

## 2020-10-30 PROCEDURE — 80053 COMPREHEN METABOLIC PANEL: CPT | Performed by: INTERNAL MEDICINE

## 2020-10-30 PROCEDURE — 36415 COLL VENOUS BLD VENIPUNCTURE: CPT | Performed by: INTERNAL MEDICINE

## 2020-11-02 ENCOUNTER — OFFICE VISIT (OUTPATIENT)
Dept: INTERNAL MEDICINE | Facility: CLINIC | Age: 60
End: 2020-11-02
Payer: COMMERCIAL

## 2020-11-02 VITALS
WEIGHT: 125 LBS | OXYGEN SATURATION: 97 % | RESPIRATION RATE: 16 BRPM | HEIGHT: 68 IN | BODY MASS INDEX: 18.94 KG/M2 | SYSTOLIC BLOOD PRESSURE: 110 MMHG | TEMPERATURE: 98.3 F | DIASTOLIC BLOOD PRESSURE: 68 MMHG | HEART RATE: 74 BPM

## 2020-11-02 DIAGNOSIS — Z12.4 SCREENING FOR CERVICAL CANCER: ICD-10-CM

## 2020-11-02 DIAGNOSIS — Z76.89 REFERRAL OF PATIENT: ICD-10-CM

## 2020-11-02 DIAGNOSIS — Z00.00 ROUTINE HISTORY AND PHYSICAL EXAMINATION OF ADULT: Primary | ICD-10-CM

## 2020-11-02 LAB — DEPRECATED CALCIDIOL+CALCIFEROL SERPL-MC: 38 UG/L (ref 20–75)

## 2020-11-02 PROCEDURE — G0123 SCREEN CERV/VAG THIN LAYER: HCPCS | Performed by: INTERNAL MEDICINE

## 2020-11-02 PROCEDURE — 87624 HPV HI-RISK TYP POOLED RSLT: CPT | Performed by: INTERNAL MEDICINE

## 2020-11-02 PROCEDURE — 99396 PREV VISIT EST AGE 40-64: CPT | Performed by: INTERNAL MEDICINE

## 2020-11-02 ASSESSMENT — MIFFLIN-ST. JEOR: SCORE: 1187.56

## 2020-11-02 NOTE — PROGRESS NOTES
SUBJECTIVE                                                      HPI: Sue Wakefield is a very pleasant 60 year old female who presents for a physical.    ROS:  Constitutional: no unintentional weight loss or gain reported; no fevers, chills, or sweats reported  Cardiovascular: no chest pain, palpitations, or edema reported  Respiratory: no cough, wheezing, shortness of breath, or dyspnea on exertion reported  Gastrointestinal: no nausea, vomiting, constipation, diarrhea, or abdominal pain reported  Genitourinary: no urinary frequency, urgency, dysuria, or hematuria reported  Integumentary: no rash or pruritus reported  Musculoskeletal: no back pain, muscle pain, joint pain, or joint swelling reported  Neurologic: no focal weakness, numbness, or tingling reported  Hematologic: no easy bruising or bleeding reported  Endocrine: no heat or cold intolerance reported; no polyuria or polydipsia reported  Psychiatric: no anxiety or depression reported    Past Medical History:   Diagnosis Date     NO ACTIVE PROBLEMS      Past Surgical History:   Procedure Laterality Date     COLONOSCOPY  2013    Procedure: COLONOSCOPY;  colonoscopy;  Surgeon: Garry Reid MD;  Location:  GI     MICRODISCECTOMY      right L5-S1      TUBAL LIGATION       Family History   Problem Relation Age of Onset     Myocardial Infarction Father 51     Pacemaker Sister      Coronary Artery Disease Sister      Diabetes Type 2  Sister      Hypertension Brother      Breast Cancer No family hx of      Colon Cancer No family hx of      Ovarian Cancer No family hx of      Coronary Artery Disease Early Onset No family hx of      Social History     Occupational History     Occupation:      Employer: [employer]   Tobacco Use     Smoking status: Former Smoker     Packs/day: 0.00     Years: 25.00     Pack years: 0.00     Types: Cigarettes     Quit date: 1964     Years since quittin.8     Smokeless tobacco: Never  "Used     Tobacco comment: social smoker only   Substance and Sexual Activity     Alcohol use: Yes     Frequency: 2-4 times a month     Drinks per session: 1 or 2     Binge frequency: Never     Drug use: No     Sexual activity: Yes     Partners: Male   Social History Narrative    .    Two adult kids.    Walks regularly.      Allergies   Allergen Reactions     Erythromycin Hives     Current Outpatient Medications   Medication Sig     CALCIUM 600 + D 600-200 MG-IU OR TABS 1 tab BID     magnesium 250 MG tablet Take 1 tablet by mouth daily.     Misc Natural Products (PROGESTERONE EX)      Immunization History   Administered Date(s) Administered     DT (PEDS <7y) 08/12/1986     Influenza (IIV3) PF 10/06/2014, 10/02/2019     Influenza Quad, Recombinant, p-free (RIV4) 09/30/2020     Influenza Vaccine IM > 6 months Valent IIV4 09/18/2013, 10/06/2014, 10/08/2015, 10/05/2016, 10/24/2017, 10/25/2018     TD (ADULT, 7+) 02/06/2007     TDAP Vaccine (Adacel) 10/24/2017     Zoster vaccine recombinant adjuvanted (SHINGRIX) 10/30/2019, 01/02/2020     OBJECTIVE                                                      /68 (BP Location: Left arm, Patient Position: Chair, Cuff Size: Adult Regular)   Pulse 74   Temp 98.3  F (36.8  C) (Temporal)   Resp 16   Ht 1.715 m (5' 7.5\")   Wt 56.7 kg (125 lb)   LMP 06/21/2011 (Exact Date)   SpO2 97%   BMI 19.29 kg/m    Constitutional: well-appearing  Eyes: normal conjunctivae and lids  Head, Ears, and Eyes: normocephalic; normal external auditory canal and pinna; tympanic membranes visualized and normal; normal lids and conjunctivae  Neck: supple, symmetric, no thyromegaly or lymphadenopathy  Respiratory: normal respiratory effort; clear to auscultation bilaterally  Cardiovascular: regular rate and rhythm; no edema  Gastrointestinal: soft, non-tender, and non-distended; no organomegaly or masses  Genitourinary: external genitalia, urethral meatus, and vagina normal; cervix visualized " and normal in appearance  Musculoskeletal: normal gait and station  Psych: normal judgment and insight; normal mood and affect; recent and remote memory intact    PREVENTATIVE HEALTH                                                      BMI: within normal limits   Blood pressure: within normal limits   Breast CA screening: up to date   Cervical CA screening: DUE  Colon CA screening: up to date   Lung CA screening: n/a   Dexa: not medically indicated at this time   Screening HCV: completed  Screening HIV: DUE  Screening cholesterol: up to date   Screening diabetes: up to date   STD testing: no risk factors present  Alcohol misuse screening: alcohol use reviewed - no intervention indicated at this time  Immunizations: reviewed; up to date     ASSESSMENT/PLAN                                                       (Z00.00) Routine history and physical examination of adult  (primary encounter diagnosis)  Comment: PMH, PSH, FH, SH, medications, allergies, immunizations, and preventative health measures reviewed and updated as appropriate.  Plan: see below for plans.      (Z12.4) Screening for cervical cancer  Plan: pap smear obtained today.    (Z76.89) Referral of patient  Plan: referred to dermatology for further evaluation - patient to schedule.     Sonia Arndt MD   [address] [address]  T: [phone], F: [phone]  (Note was completed, in part, with Pow[employer] voice-recognition software. Documentation was reviewed, but some grammatical, spelling, and word errors may remain.)

## 2020-11-05 LAB
COPATH REPORT: NORMAL
PAP: NORMAL

## 2020-11-06 LAB
FINAL DIAGNOSIS: NORMAL
HPV HR 12 DNA CVX QL NAA+PROBE: NEGATIVE
HPV16 DNA SPEC QL NAA+PROBE: NEGATIVE
HPV18 DNA SPEC QL NAA+PROBE: NEGATIVE
SPECIMEN DESCRIPTION: NORMAL
SPECIMEN SOURCE CVX/VAG CYTO: NORMAL

## 2021-04-20 ENCOUNTER — IMMUNIZATION (OUTPATIENT)
Dept: NURSING | Facility: CLINIC | Age: 61
End: 2021-04-20
Payer: COMMERCIAL

## 2021-04-20 PROCEDURE — 0001A PR COVID VAC PFIZER DIL RECON 30 MCG/0.3 ML IM: CPT

## 2021-04-20 PROCEDURE — 91300 PR COVID VAC PFIZER DIL RECON 30 MCG/0.3 ML IM: CPT

## 2021-05-11 ENCOUNTER — IMMUNIZATION (OUTPATIENT)
Dept: NURSING | Facility: CLINIC | Age: 61
End: 2021-05-11
Attending: INTERNAL MEDICINE
Payer: COMMERCIAL

## 2021-05-11 PROCEDURE — 91300 PR COVID VAC PFIZER DIL RECON 30 MCG/0.3 ML IM: CPT

## 2021-05-11 PROCEDURE — 0002A PR COVID VAC PFIZER DIL RECON 30 MCG/0.3 ML IM: CPT

## 2021-05-27 ENCOUNTER — ANCILLARY PROCEDURE (OUTPATIENT)
Dept: MAMMOGRAPHY | Facility: CLINIC | Age: 61
End: 2021-05-27
Payer: COMMERCIAL

## 2021-05-27 DIAGNOSIS — Z12.31 VISIT FOR SCREENING MAMMOGRAM: ICD-10-CM

## 2021-05-27 PROCEDURE — 77067 SCR MAMMO BI INCL CAD: CPT | Mod: TC | Performed by: RADIOLOGY

## 2021-05-27 PROCEDURE — 77063 BREAST TOMOSYNTHESIS BI: CPT | Mod: TC | Performed by: RADIOLOGY

## 2021-10-31 ASSESSMENT — ENCOUNTER SYMPTOMS
HEADACHES: 0
JOINT SWELLING: 0
NERVOUS/ANXIOUS: 0
DIZZINESS: 0
SHORTNESS OF BREATH: 0
EYE PAIN: 0
HEARTBURN: 0
DIARRHEA: 0
DYSURIA: 0
FEVER: 0
SORE THROAT: 0
BREAST MASS: 0
PARESTHESIAS: 0
HEMATOCHEZIA: 0
FREQUENCY: 0
ARTHRALGIAS: 1
NAUSEA: 0
MYALGIAS: 0
WEAKNESS: 0
ABDOMINAL PAIN: 0
HEMATURIA: 0
PALPITATIONS: 0
CONSTIPATION: 0
COUGH: 0
CHILLS: 0

## 2021-11-03 ENCOUNTER — OFFICE VISIT (OUTPATIENT)
Dept: INTERNAL MEDICINE | Facility: CLINIC | Age: 61
End: 2021-11-03
Payer: COMMERCIAL

## 2021-11-03 VITALS
RESPIRATION RATE: 16 BRPM | BODY MASS INDEX: 18.4 KG/M2 | HEART RATE: 63 BPM | TEMPERATURE: 98.8 F | HEIGHT: 68 IN | WEIGHT: 121.4 LBS | OXYGEN SATURATION: 98 % | DIASTOLIC BLOOD PRESSURE: 80 MMHG | SYSTOLIC BLOOD PRESSURE: 100 MMHG

## 2021-11-03 DIAGNOSIS — Z00.00 ROUTINE HISTORY AND PHYSICAL EXAMINATION OF ADULT: Primary | ICD-10-CM

## 2021-11-03 DIAGNOSIS — Z76.89 REFERRAL OF PATIENT: ICD-10-CM

## 2021-11-03 PROCEDURE — 99396 PREV VISIT EST AGE 40-64: CPT | Performed by: INTERNAL MEDICINE

## 2021-11-03 ASSESSMENT — MIFFLIN-ST. JEOR: SCORE: 1166.23

## 2022-02-03 ENCOUNTER — OFFICE VISIT (OUTPATIENT)
Dept: DERMATOLOGY | Facility: CLINIC | Age: 62
End: 2022-02-03
Payer: COMMERCIAL

## 2022-02-03 VITALS — HEART RATE: 82 BPM | SYSTOLIC BLOOD PRESSURE: 94 MMHG | DIASTOLIC BLOOD PRESSURE: 61 MMHG | OXYGEN SATURATION: 98 %

## 2022-02-03 DIAGNOSIS — L82.1 SEBORRHEIC KERATOSIS: ICD-10-CM

## 2022-02-03 DIAGNOSIS — D23.9 DERMATOFIBROMA: ICD-10-CM

## 2022-02-03 DIAGNOSIS — D18.01 ANGIOMA OF SKIN: ICD-10-CM

## 2022-02-03 DIAGNOSIS — D23.9 DERMAL NEVUS: ICD-10-CM

## 2022-02-03 DIAGNOSIS — L81.4 LENTIGO: Primary | ICD-10-CM

## 2022-02-03 DIAGNOSIS — H02.60 XANTHELASMA: ICD-10-CM

## 2022-02-03 PROCEDURE — 99203 OFFICE O/P NEW LOW 30 MIN: CPT | Performed by: DERMATOLOGY

## 2022-02-03 NOTE — PROGRESS NOTES
Sue Wakefield is an extremely pleasant 61 year old year old female patient here today for spot on eyelid and right thigh.   .   Patient states this has been present for a while.  Patient reports the following symptoms:  growing.  Patient reports the following previous treatments none.  These treatments did not work.  Patient reports the following modifying factors none.  Associated symptoms: none.  Patient has no other skin complaints today.  Remainder of the HPI, Meds, PMH, Allergies, FH, and SH was reviewed in chart.      Past Medical History:   Diagnosis Date     NO ACTIVE PROBLEMS        Past Surgical History:   Procedure Laterality Date     COLONOSCOPY  2013    Procedure: COLONOSCOPY;  colonoscopy;  Surgeon: Garry Reid MD;  Location:  GI     MICRODISCECTOMY      right L5-S1      TUBAL LIGATION          Family History   Problem Relation Age of Onset     Myocardial Infarction Father 51     Pacemaker Sister      Coronary Artery Disease Sister      Diabetes Type 2  Sister      Hypertension Brother      Breast Cancer No family hx of      Colon Cancer No family hx of      Ovarian Cancer No family hx of      Coronary Artery Disease Early Onset No family hx of        Social History     Socioeconomic History     Marital status:      Spouse name: Rob     Number of children: 2     Years of education: Not on file     Highest education level: Not on file   Occupational History     Occupation:      Employer: [employer]   Tobacco Use     Smoking status: Former Smoker     Packs/day: 0.00     Years: 4.00     Pack years: 0.00     Types: Cigarettes     Quit date: 1984     Years since quittin.1     Smokeless tobacco: Never Used     Tobacco comment: social smoker only   Substance and Sexual Activity     Alcohol use: Yes     Comment: 3-4 drinks/week     Drug use: No     Sexual activity: Yes     Partners: Male   Other Topics Concern      Service No     Blood  Transfusions No     Caffeine Concern No     Occupational Exposure No     Hobby Hazards No     Sleep Concern No     Stress Concern No     Weight Concern No     Special Diet No     Back Care Yes     Exercise Yes     Comment: 3 x weekly     Bike Helmet No     Comment: NA     Seat Belt No     Self-Exams No     Comment: encouraged monthly     Parent/sibling w/ CABG, MI or angioplasty before 65F 55M? No   Social History Narrative    .    Two adult kids.    Walks regularly.      Social Determinants of Health     Financial Resource Strain: Not on file   Food Insecurity: Not on file   Transportation Needs: Not on file   Physical Activity: Not on file   Stress: Not on file   Social Connections: Not on file   Intimate Partner Violence: Not on file   Housing Stability: Not on file       Outpatient Encounter Medications as of 2/3/2022   Medication Sig Dispense Refill     CALCIUM 600 + D 600-200 MG-IU OR TABS 1 tab BID       magnesium 250 MG tablet Take 1 tablet by mouth daily.       Misc Natural Products (PROGESTERONE EX)        No facility-administered encounter medications on file as of 2/3/2022.             O:   NAD, WDWN, Alert & Oriented, Mood & Affect wnl, Vitals stable   Here today alone   BP 94/61   Pulse 82   LMP 06/21/2011 (Exact Date)   SpO2 98%    General appearance normal   Vitals stable   Alert, oriented and in no acute distress      Following lymph nodes palpated: Occipital, Cervical, Supraclavicular no lad  Pigmented macule son trunk and ext with regular borders and pigment networks  R thigh firm papule  L upper lid yellow plaque       Stuck on papules and brown macules on trunk and ext   Red papules on trunk  Flesh colored papules on trunk     The remainder of the full exam was normal; the following areas were examined:  conjunctiva/lids, oral mucosa, neck, peripheral vascular system, abdomen, lymph nodes, digits/nails, eccrine and apocrine glands, scalp/hair, face, neck, chest, abdomen, buttocks,  back, RUE, LUE, RLE, LLE       Eyes: Conjunctivae/lids:Normal     ENT: Lips, buccal mucosa, tongue: normal    MSK:Normal    Cardiovascular: peripheral edema none    Pulm: Breathing Normal    Lymph Nodes: No Head and Neck Lymphadenopathy     Neuro/Psych: Orientation:Alert and Orientedx3 ; Mood/Affect:normal       A/P:  1. Seborrheic keratosis, lentigo, angioma, dermal nevus  2. Df  Excision discussed with patient   3. L upper lid xanthelasma   Excision discussed with patient   It was a pleasure speaking to Sue Wakefield today.  Previous clinic notes and pertinent laboratory tests were reviewed prior to Sue Wakefield's visit.  BENIGN LESIONS DISCUSSED WITH PATIENT:  I discussed the specifics of tumor, prognosis, and genetics of benign lesions.  I explained that treatment of these lesions would be purely cosmetic and not medically neccessary.  I discussed with patient different removal options including excision, cautery and /or laser.      Nature and genetics of benign skin lesions dicussed with patient.  Signs and Symptoms of skin cancer discussed with patient.  Patient encouraged to perform monthly skin exams.  UV precautions reviewed with patient.  Return to clinic next appt

## 2022-02-03 NOTE — LETTER
2/3/2022         RE: Sue Wakefield  [address] [place] [id number]        Dear Colleague,    Thank you for referring your patient, Sue Wakefield, to the [facility]. Please see a copy of my visit note below.    Sue Wakefield is an extremely pleasant 61 year old year old female patient here today for spot on eyelid and right thigh.   .   Patient states this has been present for a while.  Patient reports the following symptoms:  growing.  Patient reports the following previous treatments none.  These treatments did not work.  Patient reports the following modifying factors none.  Associated symptoms: none.  Patient has no other skin complaints today.  Remainder of the HPI, Meds, PMH, Allergies, FH, and SH was reviewed in chart.      Past Medical History:   Diagnosis Date     NO ACTIVE PROBLEMS        Past Surgical History:   Procedure Laterality Date     COLONOSCOPY  2013    Procedure: COLONOSCOPY;  colonoscopy;  Surgeon: Garry Reid MD;  Location:  GI     MICRODISCECTOMY      right L5-S1      TUBAL LIGATION          Family History   Problem Relation Age of Onset     Myocardial Infarction Father 51     Pacemaker Sister      Coronary Artery Disease Sister      Diabetes Type 2  Sister      Hypertension Brother      Breast Cancer No family hx of      Colon Cancer No family hx of      Ovarian Cancer No family hx of      Coronary Artery Disease Early Onset No family hx of        Social History     Socioeconomic History     Marital status:      Spouse name: Rob     Number of children: 2     Years of education: Not on file     Highest education level: Not on file   Occupational History     Occupation:      Employer: [employer]   Tobacco Use     Smoking status: Former Smoker     Packs/day: 0.00     Years: 4.00     Pack years: 0.00     Types: Cigarettes     Quit date: 1984     Years since quittin.1      Smokeless tobacco: Never Used     Tobacco comment: social smoker only   Substance and Sexual Activity     Alcohol use: Yes     Comment: 3-4 drinks/week     Drug use: No     Sexual activity: Yes     Partners: Male   Other Topics Concern      Service No     Blood Transfusions No     Caffeine Concern No     Occupational Exposure No     Hobby Hazards No     Sleep Concern No     Stress Concern No     Weight Concern No     Special Diet No     Back Care Yes     Exercise Yes     Comment: 3 x weekly     Bike Helmet No     Comment: NA     Seat Belt No     Self-Exams No     Comment: encouraged monthly     Parent/sibling w/ CABG, MI or angioplasty before 65F 55M? No   Social History Narrative    .    Two adult kids.    Walks regularly.      Social Determinants of Health     Financial Resource Strain: Not on file   Food Insecurity: Not on file   Transportation Needs: Not on file   Physical Activity: Not on file   Stress: Not on file   Social Connections: Not on file   Intimate Partner Violence: Not on file   Housing Stability: Not on file       Outpatient Encounter Medications as of 2/3/2022   Medication Sig Dispense Refill     CALCIUM 600 + D 600-200 MG-IU OR TABS 1 tab BID       magnesium 250 MG tablet Take 1 tablet by mouth daily.       Misc Natural Products (PROGESTERONE EX)        No facility-administered encounter medications on file as of 2/3/2022.             O:   NAD, WDWN, Alert & Oriented, Mood & Affect wnl, Vitals stable   Here today alone   BP 94/61   Pulse 82   LMP 06/21/2011 (Exact Date)   SpO2 98%    General appearance normal   Vitals stable   Alert, oriented and in no acute distress      Following lymph nodes palpated: Occipital, Cervical, Supraclavicular no lad  Pigmented macule son trunk and ext with regular borders and pigment networks  R thigh firm papule  L upper lid yellow plaque       Stuck on papules and brown macules on trunk and ext   Red papules on trunk  Flesh colored papules on  trunk     The remainder of the full exam was normal; the following areas were examined:  conjunctiva/lids, oral mucosa, neck, peripheral vascular system, abdomen, lymph nodes, digits/nails, eccrine and apocrine glands, scalp/hair, face, neck, chest, abdomen, buttocks, back, RUE, LUE, RLE, LLE       Eyes: Conjunctivae/lids:Normal     ENT: Lips, buccal mucosa, tongue: normal    MSK:Normal    Cardiovascular: peripheral edema none    Pulm: Breathing Normal    Lymph Nodes: No Head and Neck Lymphadenopathy     Neuro/Psych: Orientation:Alert and Orientedx3 ; Mood/Affect:normal       A/P:  1. Seborrheic keratosis, lentigo, angioma, dermal nevus  2. Df  Excision discussed with patient   3. L upper lid xanthelasma   Excision discussed with patient   It was a pleasure speaking to Sue Wakefield today.  Previous clinic notes and pertinent laboratory tests were reviewed prior to Sue Wakefield's visit.  BENIGN LESIONS DISCUSSED WITH PATIENT:  I discussed the specifics of tumor, prognosis, and genetics of benign lesions.  I explained that treatment of these lesions would be purely cosmetic and not medically neccessary.  I discussed with patient different removal options including excision, cautery and /or laser.      Nature and genetics of benign skin lesions dicussed with patient.  Signs and Symptoms of skin cancer discussed with patient.  Patient encouraged to perform monthly skin exams.  UV precautions reviewed with patient.  Return to clinic next appt        Again, thank you for allowing me to participate in the care of your patient.        Sincerely,        Azar Rangel MD

## 2022-05-31 ENCOUNTER — ANCILLARY PROCEDURE (OUTPATIENT)
Dept: MAMMOGRAPHY | Facility: CLINIC | Age: 62
End: 2022-05-31
Attending: INTERNAL MEDICINE
Payer: COMMERCIAL

## 2022-05-31 DIAGNOSIS — Z12.31 VISIT FOR SCREENING MAMMOGRAM: ICD-10-CM

## 2022-05-31 PROCEDURE — 77067 SCR MAMMO BI INCL CAD: CPT | Mod: TC | Performed by: RADIOLOGY

## 2022-05-31 PROCEDURE — 77063 BREAST TOMOSYNTHESIS BI: CPT | Mod: TC | Performed by: RADIOLOGY

## 2022-10-15 ENCOUNTER — HEALTH MAINTENANCE LETTER (OUTPATIENT)
Age: 62
End: 2022-10-15

## 2022-10-31 ASSESSMENT — ENCOUNTER SYMPTOMS
COUGH: 0
ABDOMINAL PAIN: 0
HEARTBURN: 0
EYE PAIN: 0
NERVOUS/ANXIOUS: 0
PALPITATIONS: 0
DIZZINESS: 0
WEAKNESS: 0
MYALGIAS: 1
CHILLS: 0
HEADACHES: 0
ARTHRALGIAS: 1
PARESTHESIAS: 0
FEVER: 0
CONSTIPATION: 0
HEMATURIA: 0
DYSURIA: 0
BREAST MASS: 0
DIARRHEA: 0
SORE THROAT: 0
NAUSEA: 0
JOINT SWELLING: 0
SHORTNESS OF BREATH: 0
HEMATOCHEZIA: 0
FREQUENCY: 0

## 2022-11-07 ENCOUNTER — OFFICE VISIT (OUTPATIENT)
Dept: INTERNAL MEDICINE | Facility: CLINIC | Age: 62
End: 2022-11-07
Payer: COMMERCIAL

## 2022-11-07 VITALS
HEART RATE: 63 BPM | WEIGHT: 122.9 LBS | DIASTOLIC BLOOD PRESSURE: 68 MMHG | TEMPERATURE: 98.2 F | HEIGHT: 68 IN | SYSTOLIC BLOOD PRESSURE: 114 MMHG | OXYGEN SATURATION: 98 % | BODY MASS INDEX: 18.63 KG/M2

## 2022-11-07 DIAGNOSIS — Z12.11 SPECIAL SCREENING FOR MALIGNANT NEOPLASMS, COLON: ICD-10-CM

## 2022-11-07 DIAGNOSIS — Z00.00 ROUTINE HISTORY AND PHYSICAL EXAMINATION OF ADULT: Primary | ICD-10-CM

## 2022-11-07 PROCEDURE — 99396 PREV VISIT EST AGE 40-64: CPT | Performed by: INTERNAL MEDICINE

## 2022-11-07 NOTE — PROGRESS NOTES
ASSESSMENT/PLAN                                                       (Z00.00) Routine history and physical examination of adult  (primary encounter diagnosis)  Comment: PMH, PSH, FH, SH, medications, allergies, immunizations, and preventative health measures reviewed and updated as appropriate.  Plan: see below for plans.      (Z12.11) Special screening for malignant neoplasms, colon  Plan: FIT test ordered - patient to pick-up, complete, and mail in when able.     Sonia Arndt MD   [address] [address]  T: [phone], F: [phone]    SUBJECTIVE                                                      Sue Wakefield is a very pleasant 62 year old female who presents for a physical.    ROS:  Constitutional: no unintentional weight loss or gain reported; no fevers, chills, or sweats reported  Cardiovascular: no chest pain, palpitations, or edema reported  Respiratory: no cough, wheezing, shortness of breath, or dyspnea on exertion reported  Gastrointestinal: no nausea, vomiting, constipation, diarrhea, or abdominal pain reported  Genitourinary: no urinary frequency, urgency, dysuria, or hematuria reported  Integumentary: no rash or pruritus reported  Musculoskeletal: no back pain, muscle pain, joint pain, or joint swelling reported  Neurologic: no focal weakness, numbness, or tingling reported  Hematologic: no easy bruising or bleeding reported  Endocrine: no heat or cold intolerance reported; no polyuria or polydipsia reported  Psychiatric: no anxiety or depression reported    Past Medical History:   Diagnosis Date     No pertinent past medical history      Past Surgical History:   Procedure Laterality Date     COLONOSCOPY  2/16/2013    Procedure: COLONOSCOPY;  colonoscopy;  Surgeon: Garry Reid MD;  Location:  GI     MICRODISCECTOMY  2006    right L5-S1      TUBAL LIGATION  1997     Family History   Problem Relation Age of Onset     Myocardial Infarction  Father 51     Pacemaker Sister      Coronary Artery Disease Sister      Diabetes Type 2  Sister      Hypertension Brother      Breast Cancer No family hx of      Colon Cancer No family hx of      Ovarian Cancer No family hx of      Coronary Artery Disease Early Onset No family hx of      Social History     Occupational History     Occupation:      Employer: [employer]   Tobacco Use     Smoking status: Former     Packs/day: 0.00     Years: 4.00     Pack years: 0.00     Types: Cigarettes     Quit date: 1984     Years since quittin.8     Smokeless tobacco: Never     Tobacco comments:     social smoker only   Vaping Use     Vaping Use: Never used   Substance and Sexual Activity     Alcohol use: Yes     Comment: 3-4 drinks/week     Drug use: No     Sexual activity: Yes     Partners: Male   Social History Narrative    .    Two adult kids.    Walks regularly.      Allergies   Allergen Reactions     Erythromycin Hives     Current Outpatient Medications   Medication Sig     CALCIUM 600 + D 600-200 MG-IU OR TABS 1 tab BID     magnesium 250 MG tablet Take 1 tablet by mouth daily.     Misc Natural Products (PROGESTERONE EX)      Immunization History   Administered Date(s) Administered     COVID-19,PF,Pfizer (12+ Yrs) 2021, 2021, 2021     DT (PEDS <7y) 1986     Flu, Unspecified 10/07/2021, 10/02/2022     Influenza (IIV3) PF 10/06/2014, 10/02/2019, 10/07/2021     Influenza Quad, Recombinant, pf(RIV4) (Flublok) 2020     Influenza Vaccine IM > 6 months Valent IIV4 (Alfuria,Fluzone) 2013, 10/06/2014, 10/08/2015, 10/05/2016, 10/24/2017, 10/25/2018     Influenza Vaccine, 6+MO IM (QUADRIVALENT W/PRESERVATIVES) 10/02/2019     TD (ADULT, 7+) 2007     TDAP Vaccine (Adacel) 10/24/2017     Zoster vaccine recombinant adjuvanted (SHINGRIX) 10/30/2019, 2020     PREVENTATIVE HEALTH                                                      BMI: within normal  "limits   Blood pressure: within normal limits   Breast CA screening: up to date   Cervical CA screening: up to date   Colon CA screening: DUE  Lung CA screening: patient does not meet screening criteria  Dexa: not medically indicated at this time   Screening cholesterol: up to date - outside lab  Screening diabetes: up to date - outside lab  STD testing: no risk factors present  Alcohol misuse screening: alcohol use reviewed - no intervention indicated at this time  Immunizations: reviewed; up to date     OBJECTIVE                                                      /68   Pulse 63   Temp 98.2  F (36.8  C)   Ht 1.727 m (5' 8\")   Wt 55.7 kg (122 lb 14.4 oz)   LMP 06/21/2011 (Exact Date)   SpO2 98%   BMI 18.69 kg/m    Constitutional: well-appearing  Head, Ears, and Eyes: normocephalic; normal external auditory canal and pinna; tympanic membranes visualized and normal; normal lids and conjunctivae  Neck: supple, symmetric, no thyromegaly or lymphadenopathy  Respiratory: normal respiratory effort; clear to auscultation bilaterally  Cardiovascular: regular rate and rhythm; no edema  Gastrointestinal: soft, non-tender, and non-distended; no organomegaly or masses  Musculoskeletal: normal gait and station  Psych: normal judgment and insight; normal mood and affect; recent and remote memory intact    ---  (Note was completed, in part, with [employer] voice-recognition software. Documentation was reviewed, but some grammatical, spelling, and word errors may remain.)    "

## 2022-11-11 PROCEDURE — 82274 ASSAY TEST FOR BLOOD FECAL: CPT | Performed by: INTERNAL MEDICINE

## 2022-11-14 LAB — HEMOCCULT STL QL IA: NEGATIVE

## 2023-05-23 ENCOUNTER — PATIENT OUTREACH (OUTPATIENT)
Dept: CARE COORDINATION | Facility: CLINIC | Age: 63
End: 2023-05-23

## 2023-06-01 ENCOUNTER — ANCILLARY PROCEDURE (OUTPATIENT)
Dept: MAMMOGRAPHY | Facility: CLINIC | Age: 63
End: 2023-06-01
Attending: INTERNAL MEDICINE
Payer: COMMERCIAL

## 2023-06-01 DIAGNOSIS — Z12.31 VISIT FOR SCREENING MAMMOGRAM: ICD-10-CM

## 2023-06-01 PROCEDURE — 77067 SCR MAMMO BI INCL CAD: CPT | Mod: TC | Performed by: RADIOLOGY

## 2023-06-01 PROCEDURE — 77063 BREAST TOMOSYNTHESIS BI: CPT | Mod: TC | Performed by: RADIOLOGY

## 2023-11-01 ASSESSMENT — ENCOUNTER SYMPTOMS
DYSURIA: 0
JOINT SWELLING: 0
HEMATOCHEZIA: 0
SORE THROAT: 0
PARESTHESIAS: 0
ABDOMINAL PAIN: 0
HEARTBURN: 0
DIZZINESS: 0
ARTHRALGIAS: 1
DIARRHEA: 0
COUGH: 0
NAUSEA: 0
PALPITATIONS: 0
HEADACHES: 0
EYE PAIN: 0
WEAKNESS: 0
CONSTIPATION: 0
BREAST MASS: 0
FEVER: 0
SHORTNESS OF BREATH: 0
HEMATURIA: 0
CHILLS: 0
MYALGIAS: 0
NERVOUS/ANXIOUS: 0
FREQUENCY: 0

## 2023-11-08 ENCOUNTER — OFFICE VISIT (OUTPATIENT)
Dept: INTERNAL MEDICINE | Facility: CLINIC | Age: 63
End: 2023-11-08
Payer: COMMERCIAL

## 2023-11-08 VITALS
TEMPERATURE: 98 F | WEIGHT: 123.4 LBS | HEIGHT: 68 IN | OXYGEN SATURATION: 98 % | SYSTOLIC BLOOD PRESSURE: 108 MMHG | RESPIRATION RATE: 16 BRPM | DIASTOLIC BLOOD PRESSURE: 68 MMHG | HEART RATE: 59 BPM | BODY MASS INDEX: 18.7 KG/M2

## 2023-11-08 DIAGNOSIS — Z12.11 SPECIAL SCREENING FOR MALIGNANT NEOPLASMS, COLON: ICD-10-CM

## 2023-11-08 DIAGNOSIS — Z13.1 SCREENING FOR DIABETES MELLITUS: ICD-10-CM

## 2023-11-08 DIAGNOSIS — Z00.00 ROUTINE HISTORY AND PHYSICAL EXAMINATION OF ADULT: Primary | ICD-10-CM

## 2023-11-08 DIAGNOSIS — Z13.220 SCREENING FOR CHOLESTEROL LEVEL: ICD-10-CM

## 2023-11-08 DIAGNOSIS — R73.01 IMPAIRED FASTING GLUCOSE: ICD-10-CM

## 2023-11-08 DIAGNOSIS — Z23 NEED FOR PROPHYLACTIC VACCINATION AND INOCULATION AGAINST INFLUENZA: ICD-10-CM

## 2023-11-08 LAB
ALBUMIN SERPL BCG-MCNC: 4.4 G/DL (ref 3.5–5.2)
ALP SERPL-CCNC: 48 U/L (ref 35–104)
ALT SERPL W P-5'-P-CCNC: 15 U/L (ref 0–50)
ANION GAP SERPL CALCULATED.3IONS-SCNC: 9 MMOL/L (ref 7–15)
AST SERPL W P-5'-P-CCNC: 27 U/L (ref 0–45)
BILIRUB SERPL-MCNC: 0.6 MG/DL
BUN SERPL-MCNC: 10.2 MG/DL (ref 8–23)
CALCIUM SERPL-MCNC: 9.5 MG/DL (ref 8.8–10.2)
CHLORIDE SERPL-SCNC: 104 MMOL/L (ref 98–107)
CHOLEST SERPL-MCNC: 181 MG/DL
CREAT SERPL-MCNC: 0.76 MG/DL (ref 0.51–0.95)
DEPRECATED HCO3 PLAS-SCNC: 27 MMOL/L (ref 22–29)
EGFRCR SERPLBLD CKD-EPI 2021: 89 ML/MIN/1.73M2
GLUCOSE SERPL-MCNC: 88 MG/DL (ref 70–99)
HBA1C MFR BLD: 5.3 % (ref 0–5.6)
HDLC SERPL-MCNC: 85 MG/DL
LDLC SERPL CALC-MCNC: 86 MG/DL
NONHDLC SERPL-MCNC: 96 MG/DL
POTASSIUM SERPL-SCNC: 4.3 MMOL/L (ref 3.4–5.3)
PROT SERPL-MCNC: 7.2 G/DL (ref 6.4–8.3)
SODIUM SERPL-SCNC: 140 MMOL/L (ref 135–145)
TRIGL SERPL-MCNC: 48 MG/DL

## 2023-11-08 PROCEDURE — 90471 IMMUNIZATION ADMIN: CPT | Performed by: INTERNAL MEDICINE

## 2023-11-08 PROCEDURE — 90686 IIV4 VACC NO PRSV 0.5 ML IM: CPT | Performed by: INTERNAL MEDICINE

## 2023-11-08 PROCEDURE — 83036 HEMOGLOBIN GLYCOSYLATED A1C: CPT | Performed by: INTERNAL MEDICINE

## 2023-11-08 PROCEDURE — 80053 COMPREHEN METABOLIC PANEL: CPT | Performed by: INTERNAL MEDICINE

## 2023-11-08 PROCEDURE — 80061 LIPID PANEL: CPT | Performed by: INTERNAL MEDICINE

## 2023-11-08 PROCEDURE — 36415 COLL VENOUS BLD VENIPUNCTURE: CPT | Performed by: INTERNAL MEDICINE

## 2023-11-08 PROCEDURE — 99396 PREV VISIT EST AGE 40-64: CPT | Mod: 25 | Performed by: INTERNAL MEDICINE

## 2023-11-08 NOTE — PROGRESS NOTES
ASSESSMENT/PLAN                                                       (Z00.00) Routine history and physical examination of adult  (primary encounter diagnosis)  Comment: PMH, PSH, FH, SH, medications, allergies, immunizations, and preventative health measures reviewed and updated as appropriate.  Plan: see below for plans.      (Z23) Need for prophylactic vaccination and inoculation against influenza  Plan: flu shot given today.     (Z13.220) Screening for cholesterol level  (Z13.1) Screening for diabetes mellitus  (R73.01) Impaired fasting glucose  Plan: non-fasting labs today.     (Z12.11) Special screening for malignant neoplasms, colon  Plan: FIT test ordered - patient to pick-up, complete, and mail in when able.     Sonia Arndt MD   [address] [address]  T: [phone], F: [phone]    SUBJECTIVE                                                      Sue Wakefield is a very pleasant 63 year old female who presents for a physical.    ROS:  Constitutional: no unintentional weight loss or gain reported; no fevers, chills, or sweats reported  Cardiovascular: no chest pain, palpitations, or edema reported  Respiratory: no cough, wheezing, shortness of breath, or dyspnea on exertion reported  Gastrointestinal: no nausea, vomiting, constipation, diarrhea, or abdominal pain reported  Genitourinary: no urinary frequency, urgency, dysuria, or hematuria reported  Integumentary: no rash or pruritus reported  Musculoskeletal: no back pain, muscle pain, joint pain, or joint swelling reported  Neurologic: no focal weakness, numbness, or tingling reported  Hematologic: no easy bruising or bleeding reported  Endocrine: no heat or cold intolerance reported; no polyuria or polydipsia reported  Psychiatric: no anxiety or depression reported    Past Medical History:   Diagnosis Date    No pertinent past medical history      Past Surgical History:   Procedure Laterality Date     COLONOSCOPY  2013    Procedure: COLONOSCOPY;  colonoscopy;  Surgeon: Garry Reid MD;  Location:  GI    MICRODISCECTOMY      right L5-S1     TUBAL LIGATION       Family History   Problem Relation Age of Onset    Myocardial Infarction Father 51    Pacemaker Sister     Coronary Artery Disease Sister     Diabetes Type 2  Sister     Hypertension Brother     Breast Cancer No family hx of     Colon Cancer No family hx of     Ovarian Cancer No family hx of     Coronary Artery Disease Early Onset No family hx of      Social History     Occupational History    Occupation:      Employer: [employer]   Tobacco Use    Smoking status: Former     Packs/day: 0.00     Years: 4.00     Additional pack years: 0.00     Total pack years: 0.00     Types: Cigarettes     Quit date: 1984     Years since quittin.8    Smokeless tobacco: Never    Tobacco comments:     social smoker only   Vaping Use    Vaping Use: Never used   Substance and Sexual Activity    Alcohol use: Yes     Comment: 3-4 drinks/week    Drug use: No    Sexual activity: Yes     Partners: Male   Social History Narrative    .    Two adult kids.    Walks regularly.      Allergies   Allergen Reactions    Erythromycin Hives     Current Outpatient Medications   Medication Sig    CALCIUM 600 + D 600-200 MG-IU OR TABS 1 tab BID    magnesium 250 MG tablet Take 1 tablet by mouth daily.    Misc Natural Products (PROGESTERONE EX)      Immunization History   Administered Date(s) Administered    COVID-19 MONOVALENT 12+ (Pfizer) 2021, 2021, 2021    DT (PEDS <7y) 1986    Flu, Unspecified 10/07/2021, 10/02/2022    Influenza (IIV3) PF 10/06/2014, 10/02/2019, 10/07/2021    Influenza Vaccine 18-64 (Flublok) 2020, 2022    Influenza Vaccine >6 months (Alfuria,Fluzone) 2013, 10/06/2014, 10/08/2015, 10/05/2016, 10/24/2017, 10/25/2018    Influenza Vaccine, 6+MO IM (QUADRIVALENT W/PRESERVATIVES)  "10/02/2019    TD,PF 7+ (Tenivac) 02/06/2007    TDAP Vaccine (Adacel) 10/24/2017    Zoster recombinant adjuvanted (SHINGRIX) 10/30/2019, 01/02/2020     PREVENTATIVE HEALTH                                                      BMI: within normal limits   Blood pressure: within normal limits   Breast CA screening: up to date   Cervical CA screening: up to date   Colon CA screening: DUE  Lung CA screening: patient does not meet screening criteria  Dexa: not medically indicated at this time   Screening cholesterol: DUE  Screening diabetes: DUE  STD testing: no risk factors present  Alcohol misuse screening: alcohol use reviewed - no intervention indicated at this time  Immunizations: reviewed;  flu shot, RSV vaccine, and COVID-19 booster DUE - COVID-19 booster and RSV vaccine declined    OBJECTIVE                                                      /68   Pulse 59   Temp 98  F (36.7  C) (Tympanic)   Resp 16   Ht 1.715 m (5' 7.5\")   Wt 56 kg (123 lb 6.4 oz)   LMP 06/21/2011 (Exact Date)   SpO2 98%   BMI 19.04 kg/m    Constitutional: well-appearing  Head, Ears, and Eyes: normocephalic; normal external auditory canal and pinna; tympanic membranes visualized and normal; normal lids and conjunctivae  Neck: supple, symmetric, no thyromegaly or lymphadenopathy  Respiratory: normal respiratory effort; clear to auscultation bilaterally  Cardiovascular: regular rate and rhythm; no edema  Gastrointestinal: soft, non-tender, and non-distended; no organomegaly or masses  Musculoskeletal: normal gait and station  Psych: normal judgment and insight; normal mood and affect; recent and remote memory intact    ---  (Note was completed, in part, with [employer] voice-recognition software. Documentation was reviewed, but some grammatical, spelling, and word errors may remain.)    "

## 2023-12-10 PROCEDURE — 82274 ASSAY TEST FOR BLOOD FECAL: CPT | Performed by: INTERNAL MEDICINE

## 2023-12-12 LAB — HEMOCCULT STL QL IA: NEGATIVE

## 2024-05-02 ENCOUNTER — PATIENT OUTREACH (OUTPATIENT)
Dept: CARE COORDINATION | Facility: CLINIC | Age: 64
End: 2024-05-02
Payer: COMMERCIAL

## 2024-05-30 ENCOUNTER — PATIENT OUTREACH (OUTPATIENT)
Dept: CARE COORDINATION | Facility: CLINIC | Age: 64
End: 2024-05-30
Payer: COMMERCIAL

## 2024-06-03 ENCOUNTER — ANCILLARY PROCEDURE (OUTPATIENT)
Dept: MAMMOGRAPHY | Facility: CLINIC | Age: 64
End: 2024-06-03
Attending: INTERNAL MEDICINE
Payer: COMMERCIAL

## 2024-06-03 DIAGNOSIS — Z12.31 VISIT FOR SCREENING MAMMOGRAM: ICD-10-CM

## 2024-06-03 PROCEDURE — 77067 SCR MAMMO BI INCL CAD: CPT | Mod: TC | Performed by: RADIOLOGY

## 2024-06-03 PROCEDURE — 77063 BREAST TOMOSYNTHESIS BI: CPT | Mod: TC | Performed by: RADIOLOGY

## 2024-11-07 SDOH — HEALTH STABILITY: PHYSICAL HEALTH: ON AVERAGE, HOW MANY MINUTES DO YOU ENGAGE IN EXERCISE AT THIS LEVEL?: 30 MIN

## 2024-11-07 SDOH — HEALTH STABILITY: PHYSICAL HEALTH: ON AVERAGE, HOW MANY DAYS PER WEEK DO YOU ENGAGE IN MODERATE TO STRENUOUS EXERCISE (LIKE A BRISK WALK)?: 6 DAYS

## 2024-11-07 ASSESSMENT — SOCIAL DETERMINANTS OF HEALTH (SDOH): HOW OFTEN DO YOU GET TOGETHER WITH FRIENDS OR RELATIVES?: TWICE A WEEK

## 2024-11-11 ENCOUNTER — OFFICE VISIT (OUTPATIENT)
Dept: INTERNAL MEDICINE | Facility: CLINIC | Age: 64
End: 2024-11-11
Payer: COMMERCIAL

## 2024-11-11 VITALS
SYSTOLIC BLOOD PRESSURE: 100 MMHG | HEART RATE: 69 BPM | BODY MASS INDEX: 18.82 KG/M2 | DIASTOLIC BLOOD PRESSURE: 60 MMHG | TEMPERATURE: 97.6 F | RESPIRATION RATE: 11 BRPM | WEIGHT: 124.2 LBS | HEIGHT: 68 IN | OXYGEN SATURATION: 98 %

## 2024-11-11 DIAGNOSIS — Z00.00 ROUTINE HISTORY AND PHYSICAL EXAMINATION OF ADULT: Primary | ICD-10-CM

## 2024-11-11 DIAGNOSIS — Z12.11 SPECIAL SCREENING FOR MALIGNANT NEOPLASMS, COLON: ICD-10-CM

## 2024-11-11 DIAGNOSIS — Z13.1 SCREENING FOR DIABETES MELLITUS: ICD-10-CM

## 2024-11-11 DIAGNOSIS — Z13.220 SCREENING FOR CHOLESTEROL LEVEL: ICD-10-CM

## 2024-11-11 PROCEDURE — 99396 PREV VISIT EST AGE 40-64: CPT | Performed by: INTERNAL MEDICINE

## 2024-11-11 NOTE — PROGRESS NOTES
ASSESSMENT/PLAN                                                       (Z00.00) Routine history and physical examination of adult  (primary encounter diagnosis)  Comment: PMH, PSH, FH, SH, medications, allergies, immunizations, and preventative health measures reviewed and updated as appropriate.  Plan: see below for plans.      (Z13.220) Screening for cholesterol level  (Z13.1) Screening for diabetes mellitus  Plan: fasting labs ordered for NEXT year - patient to schedule.     (Z12.11) Special screening for malignant neoplasms, colon  Plan: FIT test ordered - patient to pick-up, complete, and mail in when able.     Sonia Arndt MD   [place] [address] [address] [address]  T: [phone], F: [phone]    SUBJECTIVE                                                      Sue Wakefield is a very pleasant 64 year old female who presents for a physical.    ROS:  Constitutional: no unintentional weight loss or gain reported; no fevers, chills, or sweats reported  Cardiovascular: no chest pain, palpitations, or edema reported  Respiratory: no cough, wheezing, shortness of breath, or dyspnea on exertion reported  Gastrointestinal: no nausea, vomiting, constipation, diarrhea, or abdominal pain reported  Genitourinary: no urinary frequency, urgency, dysuria, or hematuria reported  Integumentary: no rash or pruritus reported  Musculoskeletal: no back pain, muscle pain, joint pain, or joint swelling reported  Neurologic: no focal weakness, numbness, or tingling reported  Hematologic: no easy bruising or bleeding reported  Endocrine: no heat or cold intolerance reported; no polyuria or polydipsia reported  Psychiatric: no anxiety or depression reported    Past Medical History:   Diagnosis Date    No pertinent past medical history      Past Surgical History:   Procedure Laterality Date    COLONOSCOPY  2/16/2013    Procedure: COLONOSCOPY;  colonoscopy;  Surgeon: Garry Reid MD;  Location:   GI    MICRODISCECTOMY  2006    right L5-S1     TUBAL LIGATION  1997     Family History   Problem Relation Age of Onset    Myocardial Infarction Father 51    Coronary Artery Disease Sister     Diabetes Type 2  Sister     Hypertension Brother     Breast Cancer No family hx of     Colon Cancer No family hx of     Ovarian Cancer No family hx of     Coronary Artery Disease Early Onset No family hx of     Cerebrovascular Disease No family hx of      Social History     Occupational History    Occupation: Retired -    Tobacco Use    Smoking status: Former     Types: Cigarettes    Smokeless tobacco: Never    Tobacco comments:     Quit in 1984; smoked for 4 years; social smoker only   Vaping Use    Vaping status: Never Used   Substance and Sexual Activity    Alcohol use: Yes     Comment: 3-4 drinks/week    Drug use: No    Sexual activity: Yes     Partners: Male   Social History Narrative    .    Two adult kids.    Walks regularly.      Allergies   Allergen Reactions    Erythromycin Hives     Current Outpatient Medications   Medication Sig    CALCIUM 600 + D 600-200 MG-IU OR TABS 1 tab BID    magnesium 250 MG tablet Take 1 tablet by mouth daily.    Misc Natural Products (PROGESTERONE EX)      Immunization History   Administered Date(s) Administered    COVID-19 MONOVALENT 12+ (Pfizer) 04/20/2021, 05/11/2021, 11/24/2021    DT (PEDS <7y) 08/12/1986    Flu, Unspecified 10/07/2021, 10/02/2022    Influenza (IIV3) PF 10/06/2014, 10/02/2019, 10/07/2021    Influenza Vaccine 18-64 (Flublok) 09/30/2020, 09/25/2022    Influenza Vaccine >6 months,quad, PF 09/18/2013, 10/06/2014, 10/08/2015, 10/05/2016, 10/24/2017, 10/25/2018, 11/08/2023    Influenza Vaccine, 6+MO IM (QUADRIVALENT W/PRESERVATIVES) 10/02/2019    TD,PF 7+ (Tenivac) 02/06/2007    TDAP Vaccine (Adacel) 10/24/2017    Zoster recombinant adjuvanted (SHINGRIX) 10/30/2019, 01/02/2020     PREVENTATIVE HEALTH                                                "       BMI: within normal limits   Blood pressure: within normal limits   Breast CA screening: up to date   Cervical CA screening: up to date   Colon CA screening: DUE  Lung CA screening: patient does not meet screening criteria  Dexa: not medically indicated at this time   Screening cholesterol: up to date   Screening diabetes: up to date   STD testing: no risk factors present  Alcohol misuse screening: alcohol use reviewed - no intervention indicated at this time  Immunizations: reviewed;  COVID-19 booster DUE - previously declined (not discussed at today's visit)    OBJECTIVE                                                      /60 (BP Location: Left arm, Patient Position: Sitting, Cuff Size: Adult Regular)   Pulse 69   Temp 97.6  F (36.4  C) (Temporal)   Resp 11   Ht 1.715 m (5' 7.5\")   Wt 56.3 kg (124 lb 3.2 oz)   LMP 06/21/2011 (Exact Date)   SpO2 98%   BMI 19.17 kg/m    Constitutional: well-appearing  Head, Ears, and Eyes: normocephalic; normal external auditory canal and pinna; tympanic membranes visualized and normal; normal lids and conjunctivae  Neck: supple, symmetric, no thyromegaly or lymphadenopathy  Respiratory: normal respiratory effort; clear to auscultation bilaterally  Cardiovascular: regular rate and rhythm; no edema  Gastrointestinal: soft, non-tender, and non-distended; no organomegaly or masses  Musculoskeletal: normal gait and station  Psych: normal judgment and insight; normal mood and affect; recent and remote memory intact    ---  (Note was completed, in part, with [employer] voice-recognition software. Documentation was reviewed, but some grammatical, spelling, and word errors may remain.)    "

## 2024-12-16 LAB — HEMOCCULT STL QL IA: NEGATIVE

## 2025-03-05 ENCOUNTER — OFFICE VISIT (OUTPATIENT)
Dept: DERMATOLOGY | Facility: CLINIC | Age: 65
End: 2025-03-05
Payer: COMMERCIAL

## 2025-03-05 DIAGNOSIS — D22.9 MULTIPLE BENIGN NEVI: ICD-10-CM

## 2025-03-05 DIAGNOSIS — L81.4 LENTIGINES: ICD-10-CM

## 2025-03-05 DIAGNOSIS — L57.8 ACTINIC SKIN DAMAGE: Primary | ICD-10-CM

## 2025-03-05 PROCEDURE — 99203 OFFICE O/P NEW LOW 30 MIN: CPT | Performed by: STUDENT IN AN ORGANIZED HEALTH CARE EDUCATION/TRAINING PROGRAM

## 2025-03-05 PROCEDURE — 1126F AMNT PAIN NOTED NONE PRSNT: CPT | Performed by: STUDENT IN AN ORGANIZED HEALTH CARE EDUCATION/TRAINING PROGRAM

## 2025-03-05 ASSESSMENT — PAIN SCALES - GENERAL: PAINLEVEL_OUTOF10: NO PAIN (0)

## 2025-03-05 NOTE — LETTER
3/5/2025      Sue Wakefield  [address] [place] [id number]      Dear Colleague,    Thank you for referring your patient, Sue Wakefield, to the [facility]. Please see a copy of my visit note below.    [facility] Dermatology Note    Encounter Date: Mar 5, 2025    Dermatology Problem List:    ______________________________________    Impression/Plan:  uSe was seen today for skin check.    Diagnoses and all orders for this visit:    Actinic skin damage  Multiple benign nevi  Lentigines  - Reviewed the compounding benefits of incremental changes to sun protective behaviors including increased frequency of sunscreen and sun protective clothing like broad brimmed hats and longsleeved UPF containing clothing        Follow-up PRN.       Staff Involved:  Staff Only    Gino Kearns MD   of Dermatology  Department of Dermatology  [facility]      CC:   Chief Complaint   Patient presents with     Skin Check     [facility] [place]       History of Present Illness:  Ms. Sue Wakefield is a 64 year old female who presents as a return patient.    Pt presents today for concerns about spots on trunk and extremities       Labs:      Physical exam:  Vitals: LMP 06/21/2011 (Exact Date)   GEN: well developed, well-nourished, in no acute distress, in a pleasant mood.     SKIN: [place] phototype 1  - Full skin, which includes the head/face, both arms, chest, back, abdomen,both legs, genitalia and/or groin buttocks, digits and/or nails, was examined.  - Flat brown macules and patches in a sun exposed areas on face and extremities  - scattered brown papules on trunk and extremities   - No other lesions of concern on areas examined.     Past Medical History:   Past Medical History:   Diagnosis Date     No pertinent past medical history      Past Surgical History:   Procedure Laterality Date     COLONOSCOPY  2/16/2013    Procedure:  COLONOSCOPY;  colonoscopy;  Surgeon: Garry Reid MD;  Location: [facility]     MICRODISCECTOMY  2006    right L5-S1      TUBAL LIGATION  1997       Social History:   reports that she has quit smoking. Her smoking use included cigarettes. She has never used smokeless tobacco. She reports current alcohol use. She reports that she does not use drugs.    Family History:  Family History   Problem Relation Age of Onset     Myocardial Infarction Father 51     Coronary Artery Disease Sister      Diabetes Type 2  Sister      Hypertension Brother      Breast Cancer No family hx of      Colon Cancer No family hx of      Ovarian Cancer No family hx of      Coronary Artery Disease Early Onset No family hx of      Cerebrovascular Disease No family hx of        Medications:  Current Outpatient Medications   Medication Sig Dispense Refill     CALCIUM 600 + D 600-200 MG-IU OR TABS 1 tab BID       magnesium 250 MG tablet Take 1 tablet by mouth daily.       Misc Natural Products (PROGESTERONE EX)        Allergies   Allergen Reactions     Erythromycin Hives               Again, thank you for allowing me to participate in the care of your patient.        Sincerely,        Gino Kearns MD    Electronically signed

## 2025-03-05 NOTE — PROGRESS NOTES
[facility] Dermatology Note    Encounter Date: Mar 5, 2025    Dermatology Problem List:    ______________________________________    Impression/Plan:  Sue was seen today for skin check.    Diagnoses and all orders for this visit:    Actinic skin damage  Multiple benign nevi  Lentigines  - Reviewed the compounding benefits of incremental changes to sun protective behaviors including increased frequency of sunscreen and sun protective clothing like broad brimmed hats and longsleeved UPF containing clothing        Follow-up PRN.       Staff Involved:  Staff Only    Gino Kearns MD   of Dermatology  Department of Dermatology  [facility]      CC:   Chief Complaint   Patient presents with    Skin Check     [facility] [place]       History of Present Illness:  Ms. Sue Wakefield is a 64 year old female who presents as a return patient.    Pt presents today for concerns about spots on trunk and extremities       Labs:      Physical exam:  Vitals: LMP 06/21/2011 (Exact Date)   GEN: well developed, well-nourished, in no acute distress, in a pleasant mood.     SKIN: [place] phototype 1  - Full skin, which includes the head/face, both arms, chest, back, abdomen,both legs, genitalia and/or groin buttocks, digits and/or nails, was examined.  - Flat brown macules and patches in a sun exposed areas on face and extremities  - scattered brown papules on trunk and extremities   - No other lesions of concern on areas examined.     Past Medical History:   Past Medical History:   Diagnosis Date    No pertinent past medical history      Past Surgical History:   Procedure Laterality Date    COLONOSCOPY  2/16/2013    Procedure: COLONOSCOPY;  colonoscopy;  Surgeon: Garry Reid MD;  Location:  GI    MICRODISCECTOMY  2006    right L5-S1     TUBAL LIGATION  1997       Social History:   reports that she has quit smoking. Her smoking use included cigarettes. She has never used  smokeless tobacco. She reports current alcohol use. She reports that she does not use drugs.    Family History:  Family History   Problem Relation Age of Onset    Myocardial Infarction Father 51    Coronary Artery Disease Sister     Diabetes Type 2  Sister     Hypertension Brother     Breast Cancer No family hx of     Colon Cancer No family hx of     Ovarian Cancer No family hx of     Coronary Artery Disease Early Onset No family hx of     Cerebrovascular Disease No family hx of        Medications:  Current Outpatient Medications   Medication Sig Dispense Refill    CALCIUM 600 + D 600-200 MG-IU OR TABS 1 tab BID      magnesium 250 MG tablet Take 1 tablet by mouth daily.      Misc Natural Products (PROGESTERONE EX)        Allergies   Allergen Reactions    Erythromycin Hives

## 2025-05-22 ENCOUNTER — PATIENT OUTREACH (OUTPATIENT)
Dept: CARE COORDINATION | Facility: CLINIC | Age: 65
End: 2025-05-22
Payer: COMMERCIAL

## 2025-06-09 ENCOUNTER — ANCILLARY PROCEDURE (OUTPATIENT)
Dept: MAMMOGRAPHY | Facility: CLINIC | Age: 65
End: 2025-06-09
Attending: INTERNAL MEDICINE
Payer: COMMERCIAL

## 2025-06-09 DIAGNOSIS — Z12.31 VISIT FOR SCREENING MAMMOGRAM: ICD-10-CM

## 2025-06-09 PROCEDURE — 77063 BREAST TOMOSYNTHESIS BI: CPT | Mod: TC | Performed by: RADIOLOGY

## 2025-06-09 PROCEDURE — 77067 SCR MAMMO BI INCL CAD: CPT | Mod: TC | Performed by: RADIOLOGY
